# Patient Record
Sex: MALE | Race: WHITE | Employment: OTHER | URBAN - METROPOLITAN AREA
[De-identification: names, ages, dates, MRNs, and addresses within clinical notes are randomized per-mention and may not be internally consistent; named-entity substitution may affect disease eponyms.]

---

## 2017-03-01 PROBLEM — K40.91 RECURRENT LEFT INGUINAL HERNIA: Status: ACTIVE | Noted: 2017-03-01

## 2017-03-07 ENCOUNTER — HOSPITAL ENCOUNTER (OUTPATIENT)
Dept: SURGERY | Age: 62
Discharge: HOME | End: 2017-03-07

## 2017-03-07 VITALS
OXYGEN SATURATION: 94 % | RESPIRATION RATE: 20 BRPM | DIASTOLIC BLOOD PRESSURE: 72 MMHG | HEIGHT: 72 IN | SYSTOLIC BLOOD PRESSURE: 134 MMHG | HEART RATE: 95 BPM | BODY MASS INDEX: 19.28 KG/M2 | TEMPERATURE: 97.5 F | WEIGHT: 142.38 LBS

## 2017-03-07 NOTE — PERIOP NOTES
Patient verified name, , and surgery as listed in [facility]. TYPE  CASE:1b  Orders per surgeon: on chart  Labs per surgeon:none  Labs per anesthesia protocol: none :   EKG  :  none      Patient provided with handouts including guide to surgery , transfusions, pain management and hand hygiene for the family and community. Pt verbalizes understanding of all pre-op instructions . Instructed that family must be present in building at all times. Hibiclens and instructions given per hospital policy. Instructed patient to continue  previous medications as prescribed prior to surgery and  to take the following medications the day of surgery according to anesthesia guidelines : lipitor, synthroid,effexor,vyvanse       Original medication prescription bottles was not visualized during patient appointment. Continue all previous medications unless otherwise directed. Instructed patient to hold  the following medications prior to surgery: none      Patient verbalized understanding of all instructions and provided all medical/health information to the best of their ability.

## 2017-03-13 ENCOUNTER — ANESTHESIA EVENT (OUTPATIENT)
Dept: SURGERY | Age: 62
End: 2017-03-13
Payer: COMMERCIAL

## 2017-03-14 ENCOUNTER — HOSPITAL ENCOUNTER (OUTPATIENT)
Age: 62
Setting detail: OUTPATIENT SURGERY
Discharge: HOME | End: 2017-03-14
Attending: SURGERY | Admitting: SURGERY
Payer: COMMERCIAL

## 2017-03-14 ENCOUNTER — SURGERY (OUTPATIENT)
Age: 62
End: 2017-03-14

## 2017-03-14 ENCOUNTER — ANESTHESIA (OUTPATIENT)
Dept: SURGERY | Age: 62
End: 2017-03-14
Payer: COMMERCIAL

## 2017-03-14 VITALS
SYSTOLIC BLOOD PRESSURE: 115 MMHG | TEMPERATURE: 98.4 F | HEIGHT: 72 IN | OXYGEN SATURATION: 97 % | BODY MASS INDEX: 19.1 KG/M2 | HEART RATE: 56 BPM | DIASTOLIC BLOOD PRESSURE: 56 MMHG | WEIGHT: 141 LBS | RESPIRATION RATE: 16 BRPM

## 2017-03-14 PROBLEM — K40.30 INCARCERATED INGUINAL HERNIA: Status: ACTIVE | Noted: 2017-03-14

## 2017-03-14 PROCEDURE — 74011250637 HC RX REV CODE- 250/637: Performed by: ANESTHESIOLOGY

## 2017-03-14 PROCEDURE — 77030012406 HC DRN WND PENRS BARD -A: Performed by: SURGERY

## 2017-03-14 PROCEDURE — 77030002986 HC SUT PROL J&J -A: Performed by: SURGERY

## 2017-03-14 PROCEDURE — 77030008703 HC TU ET UNCUF COVD -A: Performed by: ANESTHESIOLOGY

## 2017-03-14 PROCEDURE — 77030031139 HC SUT VCRL2 J&J -A: Performed by: SURGERY

## 2017-03-14 PROCEDURE — 74011000250 HC RX REV CODE- 250: Performed by: SURGERY

## 2017-03-14 PROCEDURE — 76210000063 HC OR PH I REC FIRST 0.5 HR: Performed by: SURGERY

## 2017-03-14 PROCEDURE — 77030018836 HC SOL IRR NACL ICUM -A: Performed by: SURGERY

## 2017-03-14 PROCEDURE — 74011250636 HC RX REV CODE- 250/636

## 2017-03-14 PROCEDURE — 77030008467 HC STPLR SKN COVD -B: Performed by: SURGERY

## 2017-03-14 PROCEDURE — 74011250636 HC RX REV CODE- 250/636: Performed by: ANESTHESIOLOGY

## 2017-03-14 PROCEDURE — 77030020782 HC GWN BAIR PAWS FLX 3M -B: Performed by: ANESTHESIOLOGY

## 2017-03-14 PROCEDURE — 74011000250 HC RX REV CODE- 250

## 2017-03-14 PROCEDURE — C1781 MESH (IMPLANTABLE): HCPCS | Performed by: SURGERY

## 2017-03-14 PROCEDURE — 76010000162 HC OR TIME 1.5 TO 2 HR INTENSV-TIER 1: Performed by: SURGERY

## 2017-03-14 PROCEDURE — 77030018673: Performed by: SURGERY

## 2017-03-14 PROCEDURE — 77030003028 HC SUT VCRL J&J -A: Performed by: SURGERY

## 2017-03-14 PROCEDURE — 76210000021 HC REC RM PH II 0.5 TO 1 HR: Performed by: SURGERY

## 2017-03-14 PROCEDURE — 77030011640 HC PAD GRND REM COVD -A: Performed by: SURGERY

## 2017-03-14 PROCEDURE — 76060000034 HC ANESTHESIA 1.5 TO 2 HR: Performed by: SURGERY

## 2017-03-14 PROCEDURE — 77030002996 HC SUT SLK J&J -A: Performed by: SURGERY

## 2017-03-14 PROCEDURE — 77030032490 HC SLV COMPR SCD KNE COVD -B: Performed by: SURGERY

## 2017-03-14 PROCEDURE — 77030008477 HC STYL SATN SLP COVD -A: Performed by: ANESTHESIOLOGY

## 2017-03-14 PROCEDURE — 74011250636 HC RX REV CODE- 250/636: Performed by: SURGERY

## 2017-03-14 DEVICE — MESH HERN W3XL6IN INGUINAL POLYPR MFIL RECTANG: Type: IMPLANTABLE DEVICE | Site: GROIN | Status: FUNCTIONAL

## 2017-03-14 RX ORDER — CEFAZOLIN SODIUM IN 0.9 % NACL 2 G/50 ML
2 INTRAVENOUS SOLUTION, PIGGYBACK (ML) INTRAVENOUS ONCE
Status: COMPLETED | OUTPATIENT
Start: 2017-03-14 | End: 2017-03-14

## 2017-03-14 RX ORDER — NEOSTIGMINE METHYLSULFATE 1 MG/ML
INJECTION INTRAVENOUS AS NEEDED
Status: DISCONTINUED | OUTPATIENT
Start: 2017-03-14 | End: 2017-03-14 | Stop reason: HOSPADM

## 2017-03-14 RX ORDER — BUPIVACAINE HYDROCHLORIDE 2.5 MG/ML
INJECTION, SOLUTION EPIDURAL; INFILTRATION; INTRACAUDAL AS NEEDED
Status: DISCONTINUED | OUTPATIENT
Start: 2017-03-14 | End: 2017-03-14 | Stop reason: HOSPADM

## 2017-03-14 RX ORDER — LIDOCAINE HYDROCHLORIDE 20 MG/ML
INJECTION, SOLUTION EPIDURAL; INFILTRATION; INTRACAUDAL; PERINEURAL AS NEEDED
Status: DISCONTINUED | OUTPATIENT
Start: 2017-03-14 | End: 2017-03-14 | Stop reason: HOSPADM

## 2017-03-14 RX ORDER — SODIUM CHLORIDE 0.9 % (FLUSH) 0.9 %
5-10 SYRINGE (ML) INJECTION AS NEEDED
Status: DISCONTINUED | OUTPATIENT
Start: 2017-03-14 | End: 2017-03-14 | Stop reason: HOSPADM

## 2017-03-14 RX ORDER — SODIUM CHLORIDE 9 MG/ML
50 INJECTION, SOLUTION INTRAVENOUS CONTINUOUS
Status: DISCONTINUED | OUTPATIENT
Start: 2017-03-14 | End: 2017-03-14 | Stop reason: HOSPADM

## 2017-03-14 RX ORDER — HYDROMORPHONE HYDROCHLORIDE 2 MG/ML
0.5 INJECTION, SOLUTION INTRAMUSCULAR; INTRAVENOUS; SUBCUTANEOUS
Status: DISCONTINUED | OUTPATIENT
Start: 2017-03-14 | End: 2017-03-14 | Stop reason: HOSPADM

## 2017-03-14 RX ORDER — SODIUM CHLORIDE 0.9 % (FLUSH) 0.9 %
5-10 SYRINGE (ML) INJECTION EVERY 8 HOURS
Status: DISCONTINUED | OUTPATIENT
Start: 2017-03-14 | End: 2017-03-14 | Stop reason: HOSPADM

## 2017-03-14 RX ORDER — ONDANSETRON 2 MG/ML
INJECTION INTRAMUSCULAR; INTRAVENOUS AS NEEDED
Status: DISCONTINUED | OUTPATIENT
Start: 2017-03-14 | End: 2017-03-14 | Stop reason: HOSPADM

## 2017-03-14 RX ORDER — HYDROCODONE BITARTRATE AND ACETAMINOPHEN 5; 325 MG/1; MG/1
1 TABLET ORAL AS NEEDED
Status: DISCONTINUED | OUTPATIENT
Start: 2017-03-14 | End: 2017-03-14 | Stop reason: HOSPADM

## 2017-03-14 RX ORDER — FAMOTIDINE 10 MG/ML
20 INJECTION INTRAVENOUS ONCE
Status: DISCONTINUED | OUTPATIENT
Start: 2017-03-14 | End: 2017-03-14 | Stop reason: HOSPADM

## 2017-03-14 RX ORDER — ROCURONIUM BROMIDE 10 MG/ML
INJECTION, SOLUTION INTRAVENOUS AS NEEDED
Status: DISCONTINUED | OUTPATIENT
Start: 2017-03-14 | End: 2017-03-14 | Stop reason: HOSPADM

## 2017-03-14 RX ORDER — NEOSTIGMINE METHYLSULFATE 1 MG/ML
INJECTION INTRAVENOUS AS NEEDED
Status: DISCONTINUED | OUTPATIENT
Start: 2017-03-14 | End: 2017-03-14

## 2017-03-14 RX ORDER — BACITRACIN 50000 [IU]/1
INJECTION, POWDER, FOR SOLUTION INTRAMUSCULAR AS NEEDED
Status: DISCONTINUED | OUTPATIENT
Start: 2017-03-14 | End: 2017-03-14 | Stop reason: HOSPADM

## 2017-03-14 RX ORDER — LIDOCAINE HYDROCHLORIDE 10 MG/ML
0.1 INJECTION INFILTRATION; PERINEURAL AS NEEDED
Status: DISCONTINUED | OUTPATIENT
Start: 2017-03-14 | End: 2017-03-14 | Stop reason: HOSPADM

## 2017-03-14 RX ORDER — FENTANYL CITRATE 50 UG/ML
INJECTION, SOLUTION INTRAMUSCULAR; INTRAVENOUS AS NEEDED
Status: DISCONTINUED | OUTPATIENT
Start: 2017-03-14 | End: 2017-03-14 | Stop reason: HOSPADM

## 2017-03-14 RX ORDER — PROPOFOL 10 MG/ML
INJECTION, EMULSION INTRAVENOUS AS NEEDED
Status: DISCONTINUED | OUTPATIENT
Start: 2017-03-14 | End: 2017-03-14 | Stop reason: HOSPADM

## 2017-03-14 RX ORDER — SODIUM CHLORIDE, SODIUM LACTATE, POTASSIUM CHLORIDE, CALCIUM CHLORIDE 600; 310; 30; 20 MG/100ML; MG/100ML; MG/100ML; MG/100ML
150 INJECTION, SOLUTION INTRAVENOUS CONTINUOUS
Status: DISCONTINUED | OUTPATIENT
Start: 2017-03-14 | End: 2017-03-14 | Stop reason: HOSPADM

## 2017-03-14 RX ORDER — FENTANYL CITRATE 50 UG/ML
100 INJECTION, SOLUTION INTRAMUSCULAR; INTRAVENOUS ONCE
Status: DISCONTINUED | OUTPATIENT
Start: 2017-03-14 | End: 2017-03-14 | Stop reason: HOSPADM

## 2017-03-14 RX ORDER — MIDAZOLAM HYDROCHLORIDE 1 MG/ML
2 INJECTION, SOLUTION INTRAMUSCULAR; INTRAVENOUS
Status: DISCONTINUED | OUTPATIENT
Start: 2017-03-14 | End: 2017-03-14 | Stop reason: HOSPADM

## 2017-03-14 RX ORDER — GLYCOPYRROLATE 0.2 MG/ML
INJECTION INTRAMUSCULAR; INTRAVENOUS AS NEEDED
Status: DISCONTINUED | OUTPATIENT
Start: 2017-03-14 | End: 2017-03-14 | Stop reason: HOSPADM

## 2017-03-14 RX ORDER — ACETAMINOPHEN 500 MG
1000 TABLET ORAL
Status: DISCONTINUED | OUTPATIENT
Start: 2017-03-14 | End: 2017-03-14 | Stop reason: HOSPADM

## 2017-03-14 RX ORDER — FAMOTIDINE 20 MG/1
20 TABLET, FILM COATED ORAL ONCE
Status: COMPLETED | OUTPATIENT
Start: 2017-03-14 | End: 2017-03-14

## 2017-03-14 RX ADMIN — FENTANYL CITRATE 50 MCG: 50 INJECTION, SOLUTION INTRAMUSCULAR; INTRAVENOUS at 14:09

## 2017-03-14 RX ADMIN — ROCURONIUM BROMIDE 30 MG: 10 INJECTION, SOLUTION INTRAVENOUS at 13:26

## 2017-03-14 RX ADMIN — NEOSTIGMINE METHYLSULFATE 3 MG: 1 INJECTION INTRAVENOUS at 14:34

## 2017-03-14 RX ADMIN — GLYCOPYRROLATE 0.2 MG: 0.2 INJECTION INTRAMUSCULAR; INTRAVENOUS at 13:47

## 2017-03-14 RX ADMIN — BACITRACIN 50000 UNITS: 5000 INJECTION, POWDER, FOR SOLUTION INTRAMUSCULAR at 13:56

## 2017-03-14 RX ADMIN — SODIUM CHLORIDE, SODIUM LACTATE, POTASSIUM CHLORIDE, AND CALCIUM CHLORIDE 150 ML/HR: 600; 310; 30; 20 INJECTION, SOLUTION INTRAVENOUS at 10:57

## 2017-03-14 RX ADMIN — LIDOCAINE HYDROCHLORIDE 100 MG: 20 INJECTION, SOLUTION EPIDURAL; INFILTRATION; INTRACAUDAL; PERINEURAL at 13:26

## 2017-03-14 RX ADMIN — FENTANYL CITRATE 100 MCG: 50 INJECTION, SOLUTION INTRAMUSCULAR; INTRAVENOUS at 13:26

## 2017-03-14 RX ADMIN — ROCURONIUM BROMIDE 10 MG: 10 INJECTION, SOLUTION INTRAVENOUS at 14:05

## 2017-03-14 RX ADMIN — ACETAMINOPHEN 1000 MG: 500 TABLET ORAL at 15:49

## 2017-03-14 RX ADMIN — CEFAZOLIN 2 G: 1 INJECTION, POWDER, FOR SOLUTION INTRAMUSCULAR; INTRAVENOUS; PARENTERAL at 13:24

## 2017-03-14 RX ADMIN — ONDANSETRON 4 MG: 2 INJECTION INTRAMUSCULAR; INTRAVENOUS at 14:26

## 2017-03-14 RX ADMIN — PROPOFOL 200 MG: 10 INJECTION, EMULSION INTRAVENOUS at 13:26

## 2017-03-14 RX ADMIN — GLYCOPYRROLATE 0.4 MG: 0.2 INJECTION INTRAMUSCULAR; INTRAVENOUS at 14:34

## 2017-03-14 RX ADMIN — FAMOTIDINE 20 MG: 20 TABLET, FILM COATED ORAL at 10:57

## 2017-03-14 RX ADMIN — SODIUM CHLORIDE, SODIUM LACTATE, POTASSIUM CHLORIDE, AND CALCIUM CHLORIDE: 600; 310; 30; 20 INJECTION, SOLUTION INTRAVENOUS at 13:54

## 2017-03-14 RX ADMIN — BUPIVACAINE HYDROCHLORIDE 30 ML: 2.5 INJECTION, SOLUTION EPIDURAL; INFILTRATION; INTRACAUDAL; PERINEURAL at 14:37

## 2017-03-14 NOTE — H&P (VIEW-ONLY)
[facility]  [address] [place] [address], [address] [place] [address]  [phone]    Office Note/H&P/Consult Note   Ludwin Fowler   MRN: [id number]     : 1955        HPI: Ludwin Fowler is a 63 y.o. male who was referred for evaluation of recurrent left inguinal hernia. He reports noticing mild to moderate pain in the left groin associated with a bulge. This been present for a few weeks. He has no associated nausea or vomiting. He reports he is status post bilateral inguinal hernia repairs in the past.  He has no testicular symptoms. Nothing seems to make the pain or bulging any better or worse. Past Medical History:   Diagnosis Date    Hypertension     Psychiatric disorder      Past Surgical History:   Procedure Laterality Date    ABDOMEN SURGERY PROC UNLISTED      hernia left side about ; right done several years later    HX HEENT      bilateral cataract implants    HX HEENT      wisdom teeth extraction     Current Outpatient Prescriptions   Medication Sig    losartan (COZAAR) 50 mg tablet TAKE 1 TABLET BY MOUTH EVERY DAY    traZODone (DESYREL) 50 mg tablet TAKE 1 TO 2 TABLETS BY MOUTH EVERY NIGHT    lithium carbonate SR (LITHOBID) 300 mg CR tablet Take 900 mg by mouth daily.  atorvastatin (LIPITOR) 10 mg tablet Take 1 Tab by mouth daily.  VYVANSE 50 mg capsule Take 50 mg by mouth daily.  levothyroxine (LEVOXYL) 75 mcg tablet Take 1 Tab by mouth daily.  VENLAFAXINE HCL (EFFEXOR XR PO) Take 300 mg by mouth daily.  DIVALPROEX SODIUM (DEPAKOTE PO) Take 2,000 mg by mouth nightly. No current facility-administered medications for this visit. ALLERGIES:  Review of patient's allergies indicates no known allergies.     Social History     Social History    Marital status:      Spouse name: N/A    Number of children: N/A    Years of education: N/A     Social History Main Topics    Smoking status: Never Smoker    Smokeless tobacco: Never Used    Alcohol use No    Drug use: None    Sexual activity: Not Asked     Other Topics Concern    None     Social History Narrative     History   Smoking Status    Never Smoker   Smokeless Tobacco    Never Used     Family History   Problem Relation Age of Onset    Diabetes Father     Heart Disease Father     Hypertension Father        ROS: The patient has no difficulty with chest pain or shortness of breath. No fever or chills. Comprehensive review of systems was otherwise unremarkable except as noted above. Physical Exam:   Constitutional: Alert oriented cooperative patient in no acute distress. Visit Vitals    /82    Pulse (!) 103    Ht 6' (1.829 m)    Wt 144 lb (65.3 kg)    SpO2 100%    BMI 19.53 kg/m2     Eyes:Sclera are clear. ENMT: no obvious neck masses, no ear or lip lesions  CV: RRR. Normal perfusion  Resp: No JVD. Breathing is  non-labored. GI: soft and non-distended   . +LIH; healed bilateral groin incisions   No testicular masses  Musculoskeletal: unremarkable with normal function. Neuro:  No obvious focal deficits  Psychiatric: normal affect and mood, no memory impairment      Labs:  Lab Results   Component Value Date/Time    Sodium 139 02/11/2016 11:55 AM    Potassium 5.0 02/11/2016 11:55 AM    Chloride 104 02/11/2016 11:55 AM    CO2 21 02/11/2016 11:55 AM    BUN 15 02/11/2016 11:55 AM    Creatinine 0.74 02/11/2016 11:55 AM    Glucose 98 02/11/2016 11:55 AM    Bilirubin, total 0.5 02/11/2016 11:55 AM    AST (SGOT) 37 02/11/2016 11:55 AM    ALT (SGPT) 33 02/11/2016 11:55 AM    Alk. phosphatase 59 02/11/2016 11:55 AM       No results found for this or any previous visit.       Assessment/Plan:     )Jasmin Murphy is a 63 y.o. male who has signs and symptoms consistent with the below issues:  Problem List  Date Reviewed: 3/1/2017          Codes Class Noted    Recurrent left inguinal hernia ICD-10-CM: K40.91  ICD-9-CM: 550.91  3/1/2017        Essential hypertension ICD-10-CM: I10  ICD-9-CM: 401.9  1/7/2016        Acquired hypothyroidism ICD-10-CM: E03.9  ICD-9-CM: 244.9  1/7/2016        Hypercholesterolemia ICD-10-CM: E78.00  ICD-9-CM: 272.0  1/7/2016        Bipolar 2 disorder ([facility] ICD-10-CM: F31.81  ICD-9-CM: 296.89  1/7/2016        Impaired fasting glucose ICD-10-CM: R73.01  ICD-9-CM: 790.21  1/7/2016                He has recurrent left inguinal hernia. Informed consent was obtained for open recurrent left inguinal hernia repair with mesh. We will schedule this for him soon.         Manohar Will MD,  FACS          CC: Tiffanie Teresa MD

## 2017-03-14 NOTE — ANESTHESIA POSTPROCEDURE EVALUATION
Post-Anesthesia Evaluation and Assessment    Patient: Steve Amaya MRN: [id number]  SSN: [id number]    YOB: 1955  Age: 63 y.o. Sex: male       Cardiovascular Function/Vital Signs  Visit Vitals    /56    Pulse (!) 56    Temp 36.7 °C (98.1 °F)    Resp 16    Ht 6' (1.829 m)    Wt 64 kg (141 lb)    SpO2 97%    BMI 19.12 kg/m2       Patient is status post general anesthesia for Procedure(s):  LEFT HERNIA INGUINAL REPAIR WITH MESH. Nausea/Vomiting: None    Postoperative hydration reviewed and adequate. Pain:  Pain Scale 1: Numeric (0 - 10) (03/14/17 1549)  Pain Intensity 1: 4 (03/14/17 1549)   Managed    Neurological Status:   Neuro (WDL): Within Defined Limits (03/14/17 1457)   At baseline    Mental Status and Level of Consciousness: Arousable    Pulmonary Status:   O2 Device: Nasal cannula (03/14/17 1457)   Adequate oxygenation and airway patent    Complications related to anesthesia: None    Post-anesthesia assessment completed.  No concerns    Signed By: Vincent Mistry MD     March 14, 2017

## 2017-03-14 NOTE — ANESTHESIA PREPROCEDURE EVALUATION
Anesthetic History   No history of anesthetic complications       Comments: Occasionally wakes up confused.      Review of Systems / Medical History  Patient summary reviewed, nursing notes reviewed and pertinent labs reviewed    Pulmonary        Sleep apnea: No treatment           Neuro/Psych         Psychiatric history (bipolar on several meds.)     Cardiovascular    Hypertension: well controlled          Hyperlipidemia    Exercise tolerance: >4 METS     GI/Hepatic/Renal  Within defined limits              Endo/Other      Hypothyroidism: well controlled       Other Findings              Physical Exam    Airway  Mallampati: II  TM Distance: 4 - 6 cm  Neck ROM: normal range of motion   Mouth opening: Normal     Cardiovascular  Regular rate and rhythm,  S1 and S2 normal,  no murmur, click, rub, or gallop  Rhythm: regular  Rate: normal         Dental  No notable dental hx       Pulmonary  Breath sounds clear to auscultation               Abdominal         Other Findings            Anesthetic Plan    ASA: 2  Anesthesia type: general          Induction: Intravenous  Anesthetic plan and risks discussed with: Patient and Spouse

## 2017-03-14 NOTE — INTERVAL H&P NOTE
H&P Update:  Cathy Goodwin was seen and examined. History and physical has been reviewed. The patient has been examined.  There have been no significant clinical changes since the completion of the originally dated History and Physical.    Signed By: Stephani Hood MD     March 14, 2017 2:55 PM

## 2017-03-14 NOTE — OP NOTES
[address] [facility]  [phone]    [facility] REPORT    Name: Winnie Zhong     Date of Surgery: 3/14/2017  Med Record Number: [id number]   Age: 63 y.o. Sex: male   Pre-operative Diagnosis:   Recurrent left inguinal hernia [K40.91]  (prior surgery at [facility])  Post-operative Diagnosis: same  Procedure: Recurrent, open left Indirect Inguinal Hernia Repair with mesh (with an incarcerated direct hernia component as well)  Surgeon: Jarett Tillman MD  Assistants: none  Anesthesia:  General  Complications: none  Specimens:  none  Estimated Blood Loss:  <30cc        Procedure Description:   The risks, benefits, potential complications, treatment options, and expected outcomes were discussed with the patient pre-operatively. The possibilities of reaction to medication, chronic pain, bleeding, infection, injury to internal organs, recurrence, testicular damage, blood clots, the need for further surgery, heart attack, stroke, and death were discussed with the patient. The patient voiced understanding and gave informed consent preoperatively. The surgical site was marked preoperatively. The patient was taken to the Operating Room, and the [facility] time-out protocol checklist was followed. After the induction of adequate anesthesia, the abdomen was prepped and draped in the usual sterile fashion. An Pablo Fell was used to prevent any contact with the skin. Preoperative antibiotics were given. An oblique inguinal incision was made and carried to the external oblique fascia and external ring. The external oblique was opened in the direction of its fibers down to the ring and the spermatic cord was encircled with a penrose drain. Dissection was tedious from scar and fibrosis following prior inguinal hernia repair.   There were 2 prolene knots seen at the internal inguinal ring and it looked like the patient may have had a mesh plug surgery  and had herniated around the plug.  There was no mesh reconstruction of the groin floor. There was a small incarcerated direct hernia component near the pubic tubercle which was freed up and reduced. The larger hernia sac was identified in the spermatic cord. It was dissected free from the spermatic cord taking great care not to injury the ilioinguinal nerve or cord structures. The hernia sac was opened and ligated high from within with a 2-O silk purse string, and then a second silk suture was used to doubly ligated a little more distally. The distal sac was excised and removed from the field and proximal sac was allowed to retract into the preperitioneal space. The wound was irrigated with Bacitracin irrigation. A piece of prolene mesh was soaked in bacitracin, cut to size and anchored at the pubic tubercle with 2-0 prolene suture. This covered and treated the direct hernia component. The mesh was secured medially to the conjoint tendon and laterally to the iliopubic tract and then the defect cut in the mesh to allow accommodation the cord was secured with 2-0 prolene suture superolaterally. The wound was once again irrigated with bacitracin and hemostasis confirmed. It was then anesthetized with marcaine and the external oblique was re approximated with 2-0 vicryl suture. Sommer's fascia was closed with 3-O vicryl suture. The skin was closed with clips, and a sterile dressing of Telfa and tegaderm was placed. At the end of the operation, all sponge, instruments, and needle counts were correct.      Donald Kim MD, FACS

## 2017-03-14 NOTE — ROUTINE PROCESS
Discharge instructions reviewed with pt and family member who verbalize understanding of follow up care. One prescription stapled to discharge paperwork, given to patient and family.

## 2017-03-14 NOTE — IP AVS SNAPSHOT
Cathleen Bailey 
 
 
 [address] [facility] [address] 
[phone] Patient: Olegario Gotti MRN: [id number] BZQ:[id number] You are allergic to the following No active allergies Recent Documentation Height Weight BMI Smoking Status 1.829 m 64 kg 19.12 kg/m2 Never Smoker Emergency Contacts Name Discharge Info Relation Home Work Mobile Ml Lopez DISCHARGE CAREGIVER [3] Spouse [3] [phone] About your hospitalization You were admitted on:  March 14, 2017 [facility] You were discharged on:  March 14, 2017 Unit phone number:  [phone] Why you were hospitalized Your primary diagnosis was:  Recurrent Left Inguinal Hernia Your diagnoses also included:  Incarcerated Inguinal Hernia, Essential Hypertension, Acquired Hypothyroidism Providers Seen During Your Hospitalizations Provider Role Specialty Primary office phone Tiff Valentin MD Attending Provider General Surgery [phone] Your Primary Care Physician (PCP) Primary Care Physician Office Phone Office Fax Sandy Milian [phone] Follow-up Information Follow up With Details Comments Contact Info Elsa Landin MD   [address] [place] [address] [address] [facility] [address] 
[phone] Tiff Valentin MD Go on 3/22/2017 For re-check at 11:45AM [address] [facility] [address] Misbah Cavanaugh [address] [address] [phone] Your Appointments Wednesday March 22, 2017 11:45 AM EDT Global Post Op with Tiff Valentin MD  
[facility] ([facility]) [address] [facility] [address] [place] [address]  
[phone] Wednesday April 19, 2017  9:00 AM EDT  
LAB with [facility] ([facility]) [address] [facility] [address] [phone] Monday April 24, 2017  8:30 AM EDT PHYSICAL with Caitlyn Cleaning MD  
[facility] ([facility]) [address] Elian Santiago [place] [address] [phone] Current Discharge Medication List  
  
CONTINUE these medications which have CHANGED Dose & Instructions Dispensing Information Comments Morning Noon Evening Bedtime  
 atorvastatin 10 mg tablet Commonly known as:  LIPITOR What changed:  when to take this Your last dose was: Your next dose is:    
   
   
 Dose:  10 mg Take 1 Tab by mouth daily. Quantity:  30 Tab Refills:  12 CONTINUE these medications which have NOT CHANGED Dose & Instructions Dispensing Information Comments Morning Noon Evening Bedtime DEPAKOTE PO Your last dose was: Your next dose is:    
   
   
 Dose:  2000 mg Take 2,000 mg by mouth nightly. Refills:  0  
     
   
   
   
  
 EFFEXOR XR PO Your last dose was: Your next dose is:    
   
   
 Dose:  150 mg Take 150 mg by mouth every morning. Refills:  0  
     
   
   
   
  
 * levothyroxine 75 mcg tablet Commonly known as:  LEVOXYL Your last dose was: Your next dose is:    
   
   
 Dose:  75 mcg Take 1 Tab by mouth six (6) days a week. And 1/2 on 7th day Quantity:  90 Tab Refills:  4  
     
   
   
   
  
 * levothyroxine 75 mcg tablet Commonly known as:  SYNTHROID Your last dose was: Your next dose is: TAKE 1 TABLET BY MOUTH EVERY DAY Quantity:  90 Tab Refills:  3  
     
   
   
   
  
 lithium carbonate  mg CR tablet Commonly known as:  Catherene Bump Your last dose was: Your next dose is:    
   
   
 Dose:  900 mg Take 900 mg by mouth nightly. Refills:  0  
     
   
   
   
  
 losartan 50 mg tablet Commonly known as:  COZAAR  
   
 Your last dose was: Your next dose is: TAKE 1 TABLET BY MOUTH EVERY DAY Quantity:  90 Tab Refills:  3  
     
   
   
   
  
 traZODone 50 mg tablet Commonly known as:  Angel Paddy Your last dose was: Your next dose is: TAKE 1 TO 2 TABLETS BY MOUTH EVERY NIGHT Refills:  11  
     
   
   
   
  
 VYVANSE 50 mg Cap Generic drug:  Lisdexamfetamine Your last dose was: Your next dose is:    
   
   
 Dose:  50 mg Take 50 mg by mouth every morning. Refills:  0  
     
   
   
   
  
 * Notice: This list has 2 medication(s) that are the same as other medications prescribed for you. Read the directions carefully, and ask your doctor or other care provider to review them with you. Discharge Instructions Leave dressings 5-6 days. Then remove, but keep incision covered daily until follow-up. Try to keep incision as dry as possible to lower risk of infection. No heavy lifting (>5lbs) for 6 weeks to reduce risk of developing a hernia in the incisions. No driving until you are off pain meds for 24hrs and have no pain with movements associated with driving. Pain prescription (Percocet) on chart for patient to use as needed for pain Follow-up with Dr Susi Bonilla in 8 days on a Wednesday in the office at: 
[address], [address] 
(Call for an appt time-->[phone]) HERNIA REPAIR 
 
ACTIVITY · As tolerated and as directed by your doctor. · You may shower starting tomorrow but do not take a bath until released by your doctor. · Avoid lifting more than 5 pounds (pulling and straining). Avoid excessive use of stairs. · Take deep breathes and support incision with pillow when you cough. DIET · Clear liquids until no nausea or vomiting; then light diet for the first day. · Advance to regular diet on second day, unless directed by your doctor. · If nausea or vomiting continues, call your doctor. You may notice that your bowel movements are not regular right after your surgery. This is common. Try to avoid constipation and straining with bowel movements. You may want to take a fiber supplement every day (Miralax). If you have not had a bowel movement after a couple of days, ask your doctor about taking a mild laxative. CALL YOUR DOCTOR IF  
· Excessive bleeding that does not stop after holding pressure over the area. · Temperature of 101 degrees F or above. · Redness, excessive swelling or bruising, and/ or green or yellow, smelly discharge from incision. AFTER ANESTHESIA · For the first 24 hours: DO NOT drive, drink alcoholic beverages, or make important decisions. · Be aware of dizziness following anesthesia and while taking pain medication. · Limit your activities · Do not  operate hazardous machinery · If you have not urinated within 8 hours after discharge, please contact your surgeon on call. *  Please give a list of your current medications to your Primary Care Provider. *  Please update this list whenever your medications are discontinued, doses are 
    changed, or new medications (including over-the-counter products) are added. *  Please carry medication information at all times in case of emergency situations. No smoking/ No tobacco products/ Avoid exposure to second hand smoke Surgeon General's Warning:  Quitting smoking now greatly reduces serious risk to your health. Obesity, smoking, and sedentary lifestyle greatly increases your risk for illness A healthy diet, regular physical exercise & weight monitoring are important for maintaining a healthy lifestyle Recognize signs and symptoms of STROKE: 
 
F-face looks uneven A-arms unable to move or move unevenly S-speech slurred or non-existent T-time-call 911 as soon as signs and symptoms begin-DO NOT go Back to bed or wait to see if you get better-TIME IS BRAIN. Discharge Orders None  
  
Introducing [facility]! [employer] introduces [employer] patient portal. Now you can access parts of your medical record, email your doctor's office, and request medication refills online. 1. In your internet browser, go to [email or web address] 2. Click on the First Time User? Click Here link in the Sign In box. You will see the New Member Sign Up page. 3. Enter your [employer] Access Code exactly as it appears below. You will not need to use this code after youve completed the sign-up process. If you do not sign up before the expiration date, you must request a new code. · [employer] Access Code: [email or web address] Expires: 5/14/2017 10:41 AM 
 
4. Enter the last four digits of your Social Security Number (xxxx) and Date of Birth (mm/dd/yyyy) as indicated and click Submit. You will be taken to the next sign-up page. 5. Create a [employer] ID. This will be your [employer] login ID and cannot be changed, so think of one that is secure and easy to remember. 6. Create a [employer] password. You can change your password at any time. 7. Enter your Password Reset Question and Answer. This can be used at a later time if you forget your password. 8. Enter your e-mail address. You will receive e-mail notification when new information is available in [address] 9. Click Sign Up. You can now view and download portions of your medical record. 10. Click the Download Summary menu link to download a portable copy of your medical information. If you have questions, please visit the Frequently Asked Questions section of the [employer] website. Remember, [employer] is NOT to be used for urgent needs. For medical emergencies, dial 911. Now available from your iPhone and Android! General Information Please provide this summary of care documentation to your next provider. Patient Signature:  ____________________________________________________________ Date:  ____________________________________________________________  
  
Elmyra Sharp Provider Signature:  ____________________________________________________________ Date:  ____________________________________________________________

## 2017-03-14 NOTE — DISCHARGE INSTRUCTIONS
Leave dressings 5-6 days. Then remove, but keep incision covered daily until follow-up. Try to keep incision as dry as possible to lower risk of infection. No heavy lifting (>5lbs) for 6 weeks to reduce risk of developing a hernia in the incisions. No driving until you are off pain meds for 24hrs and have no pain with movements associated with driving. Pain prescription (Percocet) on chart for patient to use as needed for pain  Follow-up with Dr Umang Brown in 8 days on a Wednesday in the office at:  [address], [address]  (Call for an appt time-->[phone])    HERNIA REPAIR    ACTIVITY  · As tolerated and as directed by your doctor. · You may shower starting tomorrow but do not take a bath until released by your doctor. · Avoid lifting more than 5 pounds (pulling and straining). Avoid excessive use of stairs. · Take deep breathes and support incision with pillow when you cough. DIET  · Clear liquids until no nausea or vomiting; then light diet for the first day. · Advance to regular diet on second day, unless directed by your doctor. · If nausea or vomiting continues, call your doctor. You may notice that your bowel movements are not regular right after your surgery. This is common. Try to avoid constipation and straining with bowel movements. You may want to take a fiber supplement every day (Miralax). If you have not had a bowel movement after a couple of days, ask your doctor about taking a mild laxative. CALL YOUR DOCTOR IF   · Excessive bleeding that does not stop after holding pressure over the area. · Temperature of 101 degrees F or above. · Redness, excessive swelling or bruising, and/ or green or yellow, smelly discharge from incision. AFTER ANESTHESIA  · For the first 24 hours: DO NOT drive, drink alcoholic beverages, or make important decisions. · Be aware of dizziness following anesthesia and while taking pain medication.   · Limit your activities  · Do not  operate hazardous machinery  · If you have not urinated within 8 hours after discharge, please contact your surgeon on call. *  Please give a list of your current medications to your Primary Care Provider. *  Please update this list whenever your medications are discontinued, doses are      changed, or new medications (including over-the-counter products) are added. *  Please carry medication information at all times in case of emergency situations. No smoking/ No tobacco products/ Avoid exposure to second hand smoke    Surgeon General's Warning:  Quitting smoking now greatly reduces serious risk to your health. Obesity, smoking, and sedentary lifestyle greatly increases your risk for illness    A healthy diet, regular physical exercise & weight monitoring are important for maintaining a healthy lifestyle    Recognize signs and symptoms of STROKE:    F-face looks uneven    A-arms unable to move or move unevenly    S-speech slurred or non-existent    T-time-call 911 as soon as signs and symptoms begin-DO NOT go       Back to bed or wait to see if you get better-TIME IS BRAIN.

## 2017-03-21 RX ORDER — CEFAZOLIN SODIUM IN 0.9 % NACL 2 G/50 ML
2 INTRAVENOUS SOLUTION, PIGGYBACK (ML) INTRAVENOUS ONCE
Status: CANCELLED | OUTPATIENT
Start: 2017-03-21 | End: 2017-03-21

## 2017-03-22 PROBLEM — K40.30 INCARCERATED INGUINAL HERNIA: Status: RESOLVED | Noted: 2017-03-14 | Resolved: 2017-03-22

## 2018-08-08 PROBLEM — M20.41 HAMMER TOE OF RIGHT FOOT: Status: ACTIVE | Noted: 2018-08-08

## 2020-01-27 PROBLEM — Z51.81 ENCOUNTER FOR MEDICATION MONITORING: Status: ACTIVE | Noted: 2020-01-27

## 2020-01-27 PROBLEM — R44.3 HALLUCINATIONS: Status: ACTIVE | Noted: 2020-01-27

## 2020-08-21 ENCOUNTER — HOSPITAL ENCOUNTER (OUTPATIENT)
Dept: LAB | Age: 65
Discharge: HOME | End: 2020-08-21
Payer: COMMERCIAL

## 2020-08-21 DIAGNOSIS — Z13.21 ENCOUNTER FOR VITAMIN DEFICIENCY SCREENING: ICD-10-CM

## 2020-08-21 DIAGNOSIS — Z00.00 ANNUAL PHYSICAL EXAM: ICD-10-CM

## 2020-08-21 DIAGNOSIS — E03.9 ACQUIRED HYPOTHYROIDISM: ICD-10-CM

## 2020-08-21 LAB
25(OH)D3 SERPL-MCNC: 37.7 NG/ML (ref 30–100)
CORTIS BS SERPL-MCNC: 6.2 UG/DL
PROLACTIN SERPL-MCNC: 5.6 NG/ML
T4 FREE SERPL-MCNC: 0.6 NG/DL (ref 0.9–1.8)

## 2020-08-21 PROCEDURE — 36415 COLL VENOUS BLD VENIPUNCTURE: CPT

## 2020-08-21 PROCEDURE — 82306 VITAMIN D 25 HYDROXY: CPT

## 2020-08-21 PROCEDURE — 84439 ASSAY OF FREE THYROXINE: CPT

## 2020-08-21 PROCEDURE — 82533 TOTAL CORTISOL: CPT

## 2020-08-21 PROCEDURE — 84146 ASSAY OF PROLACTIN: CPT

## 2020-08-26 NOTE — PROGRESS NOTES
Discussed lab with wife  2 days ago-remind her to hold the thyroid med for 3 more weeks --then set up lab here to do fasting at 8 am and will check other pituitary hormones also with the thyroid and set up endo referral(I'll put those in).

## 2020-12-09 PROBLEM — E03.8 CENTRAL HYPOTHYROIDISM: Status: ACTIVE | Noted: 2020-12-09

## 2020-12-22 ENCOUNTER — HOSPITAL ENCOUNTER (OUTPATIENT)
Dept: MRI IMAGING | Age: 65
Discharge: HOME | End: 2020-12-22
Attending: INTERNAL MEDICINE
Payer: COMMERCIAL

## 2020-12-22 DIAGNOSIS — D35.2 PITUITARY ADENOMA (HCC): ICD-10-CM

## 2020-12-22 PROCEDURE — 70553 MRI BRAIN STEM W/O & W/DYE: CPT

## 2020-12-22 PROCEDURE — 74011250636 HC RX REV CODE- 250/636: Performed by: INTERNAL MEDICINE

## 2020-12-22 PROCEDURE — 74011000258 HC RX REV CODE- 258: Performed by: INTERNAL MEDICINE

## 2020-12-22 PROCEDURE — A9575 INJ GADOTERATE MEGLUMI 0.1ML: HCPCS | Performed by: INTERNAL MEDICINE

## 2020-12-22 RX ORDER — SODIUM CHLORIDE 0.9 % (FLUSH) 0.9 %
10 SYRINGE (ML) INJECTION
Status: COMPLETED | OUTPATIENT
Start: 2020-12-22 | End: 2020-12-22

## 2020-12-22 RX ORDER — GADOTERATE MEGLUMINE 376.9 MG/ML
13 INJECTION INTRAVENOUS
Status: COMPLETED | OUTPATIENT
Start: 2020-12-22 | End: 2020-12-22

## 2020-12-22 RX ADMIN — Medication 10 ML: at 07:55

## 2020-12-22 RX ADMIN — GADOTERATE MEGLUMINE 13 ML: 376.9 INJECTION INTRAVENOUS at 07:55

## 2020-12-22 RX ADMIN — SODIUM CHLORIDE 100 ML: 900 INJECTION, SOLUTION INTRAVENOUS at 07:55

## 2021-01-11 PROBLEM — T50.905A THROMBOCYTOPENIA DUE TO DRUGS: Status: ACTIVE | Noted: 2021-01-11

## 2021-01-11 PROBLEM — D69.59 THROMBOCYTOPENIA DUE TO DRUGS: Status: ACTIVE | Noted: 2021-01-11

## 2021-03-17 ENCOUNTER — TRANSCRIBE ORDER (OUTPATIENT)
Dept: SCHEDULING | Age: 66
End: 2021-03-17

## 2021-03-17 DIAGNOSIS — R79.89 LOW IGF-1 LEVEL: Primary | ICD-10-CM

## 2021-04-13 ENCOUNTER — HOSPITAL ENCOUNTER (OUTPATIENT)
Dept: MAMMOGRAPHY | Age: 66
Discharge: HOME | End: 2021-04-13
Attending: INTERNAL MEDICINE
Payer: COMMERCIAL

## 2021-04-13 DIAGNOSIS — D35.2 PITUITARY ADENOMA (HCC): ICD-10-CM

## 2021-04-13 DIAGNOSIS — R79.89 LOW SERUM CORTISOL LEVEL: ICD-10-CM

## 2021-04-13 DIAGNOSIS — E03.9 ACQUIRED HYPOTHYROIDISM: ICD-10-CM

## 2021-04-13 DIAGNOSIS — E23.0 PITUITARY INSUFFICIENCY (HCC): ICD-10-CM

## 2021-04-13 DIAGNOSIS — E23.0 GROWTH HORMONE DEFICIENCY (HCC): ICD-10-CM

## 2021-04-13 PROCEDURE — 77080 DXA BONE DENSITY AXIAL: CPT

## 2021-04-14 NOTE — PROGRESS NOTES
Mr. Basim Uvaldo, your bone density is slightly low but average for age. This requires no treatment. Have a great day.

## 2021-11-16 PROBLEM — E23.0 GROWTH HORMONE DEFICIENCY (HCC): Status: ACTIVE | Noted: 2021-11-16

## 2022-01-21 ENCOUNTER — HOSPITAL ENCOUNTER (OUTPATIENT)
Dept: LAB | Age: 67
Discharge: HOME | End: 2022-01-21
Payer: COMMERCIAL

## 2022-01-21 DIAGNOSIS — F31.81 BIPOLAR 2 DISORDER (HCC): ICD-10-CM

## 2022-01-21 DIAGNOSIS — I10 ESSENTIAL HYPERTENSION: ICD-10-CM

## 2022-01-21 LAB
ALBUMIN SERPL-MCNC: 3.3 G/DL (ref 3.2–4.6)
ALBUMIN/GLOB SERPL: 0.8 {RATIO} (ref 1.2–3.5)
ALP SERPL-CCNC: 62 U/L (ref 50–136)
ALT SERPL-CCNC: 23 U/L (ref 12–65)
ANION GAP SERPL CALC-SCNC: 3 MMOL/L (ref 7–16)
AST SERPL-CCNC: 14 U/L (ref 15–37)
BILIRUB SERPL-MCNC: 0.3 MG/DL (ref 0.2–1.1)
BUN SERPL-MCNC: 26 MG/DL (ref 8–23)
CALCIUM SERPL-MCNC: 9.1 MG/DL (ref 8.3–10.4)
CHLORIDE SERPL-SCNC: 109 MMOL/L (ref 98–107)
CO2 SERPL-SCNC: 29 MMOL/L (ref 21–32)
CREAT SERPL-MCNC: 0.69 MG/DL (ref 0.8–1.5)
GLOBULIN SER CALC-MCNC: 4.3 G/DL (ref 2.3–3.5)
GLUCOSE SERPL-MCNC: 79 MG/DL (ref 65–100)
POTASSIUM SERPL-SCNC: 4.3 MMOL/L (ref 3.5–5.1)
PROT SERPL-MCNC: 7.6 G/DL (ref 6.3–8.2)
SODIUM SERPL-SCNC: 141 MMOL/L (ref 136–145)
VALPROATE SERPL-MCNC: 106 UG/ML (ref 50–100)

## 2022-01-21 PROCEDURE — 80053 COMPREHEN METABOLIC PANEL: CPT

## 2022-01-21 PROCEDURE — 80164 ASSAY DIPROPYLACETIC ACD TOT: CPT

## 2022-01-21 PROCEDURE — 36415 COLL VENOUS BLD VENIPUNCTURE: CPT

## 2022-01-23 NOTE — PROGRESS NOTES
Valproic acid level stable; liver ok-CBC not done (was clinic collect rather than lab --he could redo anytime this week if possible(copy lab to Dr Toni Mccray)

## 2022-03-19 PROBLEM — M20.41 HAMMER TOE OF RIGHT FOOT: Status: ACTIVE | Noted: 2018-08-08

## 2022-03-19 PROBLEM — E03.8 CENTRAL HYPOTHYROIDISM: Status: ACTIVE | Noted: 2020-12-09

## 2022-03-19 PROBLEM — D69.59 THROMBOCYTOPENIA DUE TO DRUGS: Status: ACTIVE | Noted: 2021-01-11

## 2022-03-19 PROBLEM — K40.91 RECURRENT LEFT INGUINAL HERNIA: Status: ACTIVE | Noted: 2017-03-01

## 2022-03-19 PROBLEM — T50.905A THROMBOCYTOPENIA DUE TO DRUGS: Status: ACTIVE | Noted: 2021-01-11

## 2022-03-19 PROBLEM — E23.0 GROWTH HORMONE DEFICIENCY (HCC): Status: ACTIVE | Noted: 2021-11-16

## 2022-03-19 PROBLEM — R44.3 HALLUCINATIONS: Status: ACTIVE | Noted: 2020-01-27

## 2022-03-20 PROBLEM — Z51.81 ENCOUNTER FOR MEDICATION MONITORING: Status: ACTIVE | Noted: 2020-01-27

## 2022-06-14 DIAGNOSIS — E23.0 GROWTH HORMONE DEFICIENCY (HCC): ICD-10-CM

## 2022-06-14 DIAGNOSIS — E03.8 CENTRAL HYPOTHYROIDISM: ICD-10-CM

## 2022-06-14 DIAGNOSIS — E03.9 ACQUIRED HYPOTHYROIDISM: Primary | ICD-10-CM

## 2022-08-03 ENCOUNTER — OFFICE VISIT (OUTPATIENT)
Dept: INTERNAL MEDICINE CLINIC | Facility: CLINIC | Age: 67
End: 2022-08-03
Payer: COMMERCIAL

## 2022-08-03 VITALS
DIASTOLIC BLOOD PRESSURE: 82 MMHG | BODY MASS INDEX: 22.88 KG/M2 | WEIGHT: 151 LBS | SYSTOLIC BLOOD PRESSURE: 122 MMHG | HEIGHT: 68 IN

## 2022-08-03 DIAGNOSIS — I10 ESSENTIAL HYPERTENSION: ICD-10-CM

## 2022-08-03 DIAGNOSIS — I10 ESSENTIAL HYPERTENSION: Primary | ICD-10-CM

## 2022-08-03 DIAGNOSIS — E23.0 GROWTH HORMONE DEFICIENCY (HCC): ICD-10-CM

## 2022-08-03 DIAGNOSIS — T50.905A THROMBOCYTOPENIA DUE TO DRUGS: ICD-10-CM

## 2022-08-03 DIAGNOSIS — D69.59 THROMBOCYTOPENIA DUE TO DRUGS: ICD-10-CM

## 2022-08-03 DIAGNOSIS — F31.81 BIPOLAR 2 DISORDER (HCC): ICD-10-CM

## 2022-08-03 DIAGNOSIS — E03.8 CENTRAL HYPOTHYROIDISM: ICD-10-CM

## 2022-08-03 DIAGNOSIS — E78.00 HYPERCHOLESTEROLEMIA: ICD-10-CM

## 2022-08-03 LAB
ALBUMIN SERPL-MCNC: 3.5 G/DL (ref 3.2–4.6)
ALBUMIN/GLOB SERPL: 0.9 {RATIO} (ref 1.2–3.5)
ALP SERPL-CCNC: 52 U/L (ref 50–136)
ALT SERPL-CCNC: 20 U/L (ref 12–65)
ANION GAP SERPL CALC-SCNC: 2 MMOL/L (ref 7–16)
AST SERPL-CCNC: 12 U/L (ref 15–37)
BASOPHILS # BLD: 0.1 K/UL (ref 0–0.2)
BASOPHILS NFR BLD: 1 % (ref 0–2)
BILIRUB SERPL-MCNC: 0.5 MG/DL (ref 0.2–1.1)
BUN SERPL-MCNC: 24 MG/DL (ref 8–23)
CALCIUM SERPL-MCNC: 9.2 MG/DL (ref 8.3–10.4)
CHLORIDE SERPL-SCNC: 109 MMOL/L (ref 98–107)
CHOLEST SERPL-MCNC: 185 MG/DL
CO2 SERPL-SCNC: 30 MMOL/L (ref 21–32)
CREAT SERPL-MCNC: 0.8 MG/DL (ref 0.8–1.5)
DIFFERENTIAL METHOD BLD: ABNORMAL
EOSINOPHIL # BLD: 0.1 K/UL (ref 0–0.8)
EOSINOPHIL NFR BLD: 3 % (ref 0.5–7.8)
ERYTHROCYTE [DISTWIDTH] IN BLOOD BY AUTOMATED COUNT: 14.5 % (ref 11.9–14.6)
GLOBULIN SER CALC-MCNC: 3.9 G/DL (ref 2.3–3.5)
GLUCOSE SERPL-MCNC: 83 MG/DL (ref 65–100)
HCT VFR BLD AUTO: 47.4 % (ref 41.1–50.3)
HDLC SERPL-MCNC: 59 MG/DL (ref 40–60)
HDLC SERPL: 3.1 {RATIO}
HGB BLD-MCNC: 15.4 G/DL (ref 13.6–17.2)
IMM GRANULOCYTES # BLD AUTO: 0.1 K/UL (ref 0–0.5)
IMM GRANULOCYTES NFR BLD AUTO: 1 % (ref 0–5)
LDLC SERPL CALC-MCNC: 108.8 MG/DL
LYMPHOCYTES # BLD: 2.1 K/UL (ref 0.5–4.6)
LYMPHOCYTES NFR BLD: 47 % (ref 13–44)
MCH RBC QN AUTO: 32 PG (ref 26.1–32.9)
MCHC RBC AUTO-ENTMCNC: 32.5 G/DL (ref 31.4–35)
MCV RBC AUTO: 98.3 FL (ref 79.6–97.8)
MONOCYTES # BLD: 0.6 K/UL (ref 0.1–1.3)
MONOCYTES NFR BLD: 14 % (ref 4–12)
NEUTS SEG # BLD: 1.5 K/UL (ref 1.7–8.2)
NEUTS SEG NFR BLD: 34 % (ref 43–78)
NRBC # BLD: 0 K/UL (ref 0–0.2)
PLATELET # BLD AUTO: 144 K/UL (ref 150–450)
PMV BLD AUTO: 9.9 FL (ref 9.4–12.3)
POTASSIUM SERPL-SCNC: 5 MMOL/L (ref 3.5–5.1)
PROT SERPL-MCNC: 7.4 G/DL (ref 6.3–8.2)
RBC # BLD AUTO: 4.82 M/UL (ref 4.23–5.6)
SODIUM SERPL-SCNC: 141 MMOL/L (ref 138–145)
T4 FREE SERPL-MCNC: 0.9 NG/DL (ref 0.78–1.46)
TRIGL SERPL-MCNC: 86 MG/DL (ref 35–150)
VALPROATE SERPL-MCNC: 104 UG/ML (ref 50–100)
VIT B12 SERPL-MCNC: 1041 PG/ML (ref 193–986)
VLDLC SERPL CALC-MCNC: 17.2 MG/DL (ref 6–23)
WBC # BLD AUTO: 4.5 K/UL (ref 4.3–11.1)

## 2022-08-03 PROCEDURE — 3017F COLORECTAL CA SCREEN DOC REV: CPT | Performed by: INTERNAL MEDICINE

## 2022-08-03 PROCEDURE — G8427 DOCREV CUR MEDS BY ELIG CLIN: HCPCS | Performed by: INTERNAL MEDICINE

## 2022-08-03 PROCEDURE — G8420 CALC BMI NORM PARAMETERS: HCPCS | Performed by: INTERNAL MEDICINE

## 2022-08-03 PROCEDURE — 99213 OFFICE O/P EST LOW 20 MIN: CPT | Performed by: INTERNAL MEDICINE

## 2022-08-03 PROCEDURE — 1123F ACP DISCUSS/DSCN MKR DOCD: CPT | Performed by: INTERNAL MEDICINE

## 2022-08-03 PROCEDURE — 1036F TOBACCO NON-USER: CPT | Performed by: INTERNAL MEDICINE

## 2022-08-03 RX ORDER — VENLAFAXINE HYDROCHLORIDE 75 MG/1
75 CAPSULE, EXTENDED RELEASE ORAL DAILY
COMMUNITY

## 2022-08-03 RX ORDER — TIMOLOL MALEATE 5 MG/ML
SOLUTION/ DROPS OPHTHALMIC
COMMUNITY
Start: 2022-06-06

## 2022-08-03 ASSESSMENT — ENCOUNTER SYMPTOMS
COUGH: 0
SHORTNESS OF BREATH: 0

## 2022-08-03 NOTE — PROGRESS NOTES
SUBJECTIVE:   Taylor Corley is a 74 y.o. male seen for a visit regarding   Chief Complaint   Patient presents with    Cholesterol Problem    Thyroid Problem     Check up-fasting        Hyperlipidemia  This is a chronic problem. The current episode started more than 1 year ago. The problem is controlled. Recent lipid tests were reviewed and are normal. Pertinent negatives include no shortness of breath. Current antihyperlipidemic treatment includes statins. Other  This is a chronic problem. The current episode started more than 1 year ago. The problem occurs constantly (has pituitary hypothyroid and sees endo--T4 was normal). The problem has been unchanged. Pertinent negatives include no coughing. Past Medical History, Past Surgical History, Family history, Social History, and Medications were all reviewed with the patient today and updated as necessary. Current Outpatient Medications   Medication Sig Dispense Refill    venlafaxine (EFFEXOR XR) 75 MG extended release capsule Take 75 mg by mouth in the morning. timolol (TIMOPTIC) 0.5 % ophthalmic solution INSTILL 1 DROP INTO LEFT EYE ONCE DAILY      atorvastatin (LIPITOR) 20 MG tablet Take 20 mg by mouth daily      divalproex (DEPAKOTE ER) 250 MG extended release tablet TAKE 3 TABLETS BY MOUTH TWICE A DAY      levothyroxine (SYNTHROID) 75 MCG tablet Take 75 mcg by mouth every morning (before breakfast)      mirtazapine (REMERON) 15 MG tablet Take 15 mg by mouth nightly      QUEtiapine (SEROQUEL) 100 MG tablet TAKE 1 TABLET BY MOUTH EVERY EVENING       No current facility-administered medications for this visit.      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 Respiratory:  Negative for cough and shortness of breath. Musculoskeletal:  Positive for gait problem (ok level ground -unsteady if on grass or uneven ground-no falls). Hematological:  Does not bruise/bleed easily. Psychiatric/Behavioral:  Negative for dysphoric mood and sleep disturbance. OBJECTIVE:  /82   Ht 5' 7.5\" (1.715 m)   Wt 151 lb (68.5 kg)   BMI 23.30 kg/m²      Physical Exam  Constitutional:       General: He is not in acute distress. Appearance: Normal appearance. Cardiovascular:      Rate and Rhythm: Normal rate and regular rhythm. Heart sounds: Normal heart sounds. No murmur heard. Pulmonary:      Breath sounds: No wheezing or rales. Medical problems and test results were reviewed with the patient today. ASSESSMENT and PLAN     1. Essential hypertension  -     CBC with Auto Differential; Future  -     Comprehensive Metabolic Panel; Future  2. Central hypothyroidism  3. 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 Bipolar 2 disorder (HCC)  -     Valproic Acid Level, Total; Future  5. Hypercholesterolemia  -     Lipid Panel; Future   Endo follows pituitary-Dr Nadia Schneider the bipolar-platelets have been stable -the BP good; check lipid-copy lab to psych  Current treatment plan is effective. no changes in therapy  lab results and schedule for future lab studies reviewed with patient  Cardiovascular risk and specific lipid/ LDL goals reviewed  reviewed medications and side effects in detail     No follow-ups on file.

## 2022-08-05 LAB — IGF-I SERPL-MCNC: 55 NG/ML (ref 64–240)

## 2022-09-27 ENCOUNTER — TELEPHONE (OUTPATIENT)
Dept: INTERNAL MEDICINE CLINIC | Facility: CLINIC | Age: 67
End: 2022-09-27

## 2022-09-27 DIAGNOSIS — E78.00 PURE HYPERCHOLESTEROLEMIA, UNSPECIFIED: ICD-10-CM

## 2022-09-27 RX ORDER — ATORVASTATIN CALCIUM 20 MG/1
TABLET, FILM COATED ORAL
Qty: 90 TABLET | Refills: 3 | OUTPATIENT
Start: 2022-09-27

## 2022-09-27 RX ORDER — ATORVASTATIN CALCIUM 20 MG/1
20 TABLET, FILM COATED ORAL DAILY
Qty: 90 TABLET | Refills: 3 | Status: SHIPPED | OUTPATIENT
Start: 2022-09-27

## 2022-09-27 NOTE — TELEPHONE ENCOUNTER
Patient requesting refill for Atorvastatin to be sent to [facility] on [facility]. Patient is completely out of medication. Patient is requesting a longer dosage such as 60 days - 90 days.  Please call patient to let him know if he is able to receive are larger amount

## 2022-10-10 ENCOUNTER — TELEPHONE (OUTPATIENT)
Dept: INTERNAL MEDICINE CLINIC | Facility: CLINIC | Age: 67
End: 2022-10-10

## 2022-10-10 NOTE — TELEPHONE ENCOUNTER
The patient is having a cough and he would like to know what to do. I suggested him to make an appointment, but he prefers to have Alexa Felder to call him back at [phone].

## 2022-10-10 NOTE — TELEPHONE ENCOUNTER
Spoke with patient's wife and reports cough with chest congestion. Denies any fever or sinus issues. Asked if been tested for COVID and has not.   Wife notified would have to be tested and call with results/TD

## 2022-10-12 ENCOUNTER — TELEPHONE (OUTPATIENT)
Dept: INTERNAL MEDICINE CLINIC | Facility: CLINIC | Age: 67
End: 2022-10-12

## 2022-10-12 ENCOUNTER — TELEMEDICINE (OUTPATIENT)
Dept: INTERNAL MEDICINE CLINIC | Facility: CLINIC | Age: 67
End: 2022-10-12
Payer: MEDICARE

## 2022-10-12 DIAGNOSIS — J06.9 UPPER RESPIRATORY TRACT INFECTION, UNSPECIFIED TYPE: Primary | ICD-10-CM

## 2022-10-12 PROCEDURE — 99442 PR PHYS/QHP TELEPHONE EVALUATION 11-20 MIN: CPT | Performed by: INTERNAL MEDICINE

## 2022-10-12 RX ORDER — GUAIFENESIN AND CODEINE PHOSPHATE 100; 10 MG/5ML; MG/5ML
5 SOLUTION ORAL 3 TIMES DAILY PRN
Qty: 90 ML | Refills: 0 | Status: SHIPPED | OUTPATIENT
Start: 2022-10-12 | End: 2022-10-15

## 2022-10-12 ASSESSMENT — ENCOUNTER SYMPTOMS
RHINORRHEA: 0
COUGH: 1
SORE THROAT: 1

## 2022-10-12 NOTE — PROGRESS NOTES
SUBJECTIVE:   Patricia Yousif is a 74 y.o. male seen for a visit regarding   Chief Complaint   Patient presents with    Cough        Cough  This is a new problem. The current episode started in the past 7 days (notes cough bad about 5d ago and persists, not much mucus; saw urgent care yesterday-told viral-did not test since outside 5d window-steroid was given). The problem has been unchanged (this may have been 2 weeks long). Associated symptoms include a sore throat (initial, now gone). Pertinent negatives include no fever, myalgias or rhinorrhea. Past Medical History, Past Surgical History, Family history, Social History, and Medications were all reviewed with the patient today and updated as necessary. Current Outpatient Medications   Medication Sig Dispense Refill    guaiFENesin-codeine (TUSSI-ORGANIDIN NR) 100-10 MG/5ML syrup Take 5 mLs by mouth 3 times daily as needed for Cough for up to 3 days. 90 mL 0    atorvastatin (LIPITOR) 20 MG tablet Take 1 tablet by mouth daily 90 tablet 3    venlafaxine (EFFEXOR XR) 75 MG extended release capsule Take 75 mg by mouth in the morning. timolol (TIMOPTIC) 0.5 % ophthalmic solution INSTILL 1 DROP INTO LEFT EYE ONCE DAILY      divalproex (DEPAKOTE ER) 250 MG extended release tablet TAKE 3 TABLETS BY MOUTH TWICE A DAY      levothyroxine (SYNTHROID) 75 MCG tablet Take 75 mcg by mouth every morning (before breakfast)      mirtazapine (REMERON) 15 MG tablet Take 15 mg by mouth nightly      QUEtiapine (SEROQUEL) 100 MG tablet TAKE 1 TABLET BY MOUTH EVERY EVENING       No current facility-administered medications for this visit.      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 HENT:  Positive for sore throat (initial, now gone). Negative for congestion and rhinorrhea. Respiratory:  Positive for cough. Musculoskeletal:  Negative for myalgias. OBJECTIVE:  There were no vitals taken for this visit. Physical Exam  Pulmonary:      Effort: Pulmonary effort is normal.      Comments: Raspy cough on audio         Medical problems and test results were reviewed with the patient today. ASSESSMENT and PLAN     1. Upper respiratory tract infection, unspecified type  -     guaiFENesin-codeine (TUSSI-ORGANIDIN NR) 100-10 MG/5ML syrup; Take 5 mLs by mouth 3 times daily as needed for Cough for up to 3 days. , Disp-90 mL, R-0Normal   Seen urgent care for dx -instructed to fill steroid given -gave codeine for prn for cough  reviewed medications and side effects in detail   I was at office while conducting this encounter. Pt at home  Consent:  He and/or his healthcare decision maker is aware that this patient-initiated Telehealth encounter is a billable service, with coverage as determined by his insurance carrier. He is aware that he may receive a bill and has provided verbal consent to proceed: Yes    This virtual visit was conducted telephone    Total Time: 11-20 minutes- 12 min      No follow-ups on file.

## 2022-10-12 NOTE — TELEPHONE ENCOUNTER
----- Message from Stefanie Oneal sent at 10/12/2022 10:16 AM EDT -----  Subject: Message to Provider    QUESTIONS  Information for Provider? Patient went to Urgent Care 10/11 around 5PM.   per  patient has a heart murmur. Patient wants to know if he can go to   the [facility] on 10/15 due to he has a cough. He wants to know if this is   contagious. Please call & verify.  ---------------------------------------------------------------------------  --------------  Zora Bosworth [facility]  [phone]; OK to leave message on voicemail  ---------------------------------------------------------------------------  --------------  SCRIPT ANSWERS  Relationship to Patient?  Self

## 2022-10-13 ENCOUNTER — OFFICE VISIT (OUTPATIENT)
Dept: INTERNAL MEDICINE CLINIC | Facility: CLINIC | Age: 67
End: 2022-10-13
Payer: MEDICARE

## 2022-10-13 VITALS
WEIGHT: 151 LBS | SYSTOLIC BLOOD PRESSURE: 116 MMHG | BODY MASS INDEX: 22.88 KG/M2 | DIASTOLIC BLOOD PRESSURE: 84 MMHG | HEIGHT: 68 IN

## 2022-10-13 DIAGNOSIS — I35.8 AORTIC DIASTOLIC MURMUR: Primary | ICD-10-CM

## 2022-10-13 DIAGNOSIS — J06.9 UPPER RESPIRATORY TRACT INFECTION, UNSPECIFIED TYPE: ICD-10-CM

## 2022-10-13 PROCEDURE — 3017F COLORECTAL CA SCREEN DOC REV: CPT | Performed by: INTERNAL MEDICINE

## 2022-10-13 PROCEDURE — 1123F ACP DISCUSS/DSCN MKR DOCD: CPT | Performed by: INTERNAL MEDICINE

## 2022-10-13 PROCEDURE — 99214 OFFICE O/P EST MOD 30 MIN: CPT | Performed by: INTERNAL MEDICINE

## 2022-10-13 PROCEDURE — G8484 FLU IMMUNIZE NO ADMIN: HCPCS | Performed by: INTERNAL MEDICINE

## 2022-10-13 PROCEDURE — G8420 CALC BMI NORM PARAMETERS: HCPCS | Performed by: INTERNAL MEDICINE

## 2022-10-13 PROCEDURE — 1036F TOBACCO NON-USER: CPT | Performed by: INTERNAL MEDICINE

## 2022-10-13 PROCEDURE — 93000 ELECTROCARDIOGRAM COMPLETE: CPT | Performed by: INTERNAL MEDICINE

## 2022-10-13 PROCEDURE — G8427 DOCREV CUR MEDS BY ELIG CLIN: HCPCS | Performed by: INTERNAL MEDICINE

## 2022-10-13 RX ORDER — PREDNISONE 20 MG/1
TABLET ORAL
COMMUNITY
Start: 2022-10-11 | End: 2022-10-13 | Stop reason: ALTCHOICE

## 2022-10-13 RX ORDER — BENZONATATE 100 MG/1
100 CAPSULE ORAL 3 TIMES DAILY PRN
COMMUNITY
Start: 2022-10-11

## 2022-10-13 ASSESSMENT — ENCOUNTER SYMPTOMS
COUGH: 1
SHORTNESS OF BREATH: 0
WHEEZING: 0

## 2022-10-13 NOTE — PROGRESS NOTES
SUBJECTIVE:   Vladimir Farias is a 74 y.o. male seen for a visit regarding   Chief Complaint   Patient presents with    Heart Murmur     Was told that by Dr at urgent care a few days ago        Heart Murmur  This is a new problem. The current episode started yesterday (told had a heart murmer by urgent care yesterday-  cough started maybe 10 d ago-5 yo was there 9/29 and stayed 5 d--urgent care gave medrol; felt was viral and we gave codeine yesterday-covid not tested at urgent care since out of isolation window). The problem occurs constantly. Associated symptoms include coughing. Pertinent negatives include no chest pain or fever. Past Medical History, Past Surgical History, Family history, Social History, and Medications were all reviewed with the patient today and updated as necessary. Current Outpatient Medications   Medication Sig Dispense Refill    benzonatate (TESSALON) 100 MG capsule Take 100 mg by mouth 3 times daily as needed      guaiFENesin-codeine (TUSSI-ORGANIDIN NR) 100-10 MG/5ML syrup Take 5 mLs by mouth 3 times daily as needed for Cough for up to 3 days. 90 mL 0    atorvastatin (LIPITOR) 20 MG tablet Take 1 tablet by mouth daily 90 tablet 3    venlafaxine (EFFEXOR XR) 75 MG extended release capsule Take 75 mg by mouth in the morning. timolol (TIMOPTIC) 0.5 % ophthalmic solution INSTILL 1 DROP INTO LEFT EYE ONCE DAILY      divalproex (DEPAKOTE ER) 250 MG extended release tablet TAKE 3 TABLETS BY MOUTH TWICE A DAY      levothyroxine (SYNTHROID) 75 MCG tablet Take 75 mcg by mouth every morning (before breakfast)      mirtazapine (REMERON) 15 MG tablet Take 15 mg by mouth nightly      QUEtiapine (SEROQUEL) 100 MG tablet TAKE 1 TABLET BY MOUTH EVERY EVENING       No current facility-administered medications for this visit.      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 Respiratory:  Positive for cough. Negative for shortness of breath and wheezing. Walks 2.5 mi -no sx   Cardiovascular:  Negative for chest pain and palpitations. OBJECTIVE:  /84   Ht 5' 7.5\" (1.715 m)   Wt 151 lb (68.5 kg)   BMI 23.30 kg/m²      Physical Exam  Constitutional:       General: He is not in acute distress. Appearance: Normal appearance. Cardiovascular:      Rate and Rhythm: Normal rate and regular rhythm. Heart sounds: Murmur heard. Gallop: gr 2/6 blowing diastolic murmur -localized at aortic area --best heard sitting -very faint and hard to hear when supine. Pulmonary:      Breath sounds: No wheezing, rhonchi or rales. Musculoskeletal:      Right lower leg: Edema present. Left lower leg: Edema present. Comments: 1 plus edema bilateral          Medical problems and test results were reviewed with the patient today. ASSESSMENT and PLAN     1. Aortic diastolic murmur  -     EKG 12 Lead  -     MD Milagros, Cardiology, Radha  -     Transthoracic echocardiogram (TTE) complete with contrast, bubble, strain, and 3D PRN; Future  2.  Upper respiratory tract infection, unspecified type   Has medrol sabina -use it as 3x3d, 2x4d, 1x4d-the codeine still ok-this for the cough from tracheitis-the murmur needs further eval --EKG with LBBB, 1 deg AVB also (not present on a rhythm strip few years ago at hospital-)-no sx from this but see cardiology and get echo also  reviewed medications and side effects in detail     No follow-ups on file.

## 2022-10-14 ENCOUNTER — TELEPHONE (OUTPATIENT)
Dept: INTERNAL MEDICINE CLINIC | Facility: CLINIC | Age: 67
End: 2022-10-14

## 2022-10-14 NOTE — TELEPHONE ENCOUNTER
Patient called in requesting information on how he needs to be taking the medication prescribed by urgent care and Dr King Campoverde. The patient was given Prednisone 20 mg by Urgent Care and was under the impression that Dr King Campoverde was giving something different. Per Dr Mary Abdalla instructions, the patient needs to take 1 20 mg tablet of Prednisone for 4 days, then take 10 mg (1 tab in half) for 4 days. If symptoms persist, call the office to let us know.

## 2022-10-14 NOTE — TELEPHONE ENCOUNTER
I left pt's wife Olen Nyhan the message from Dr April Solomon re: letting her know pt should take 20 mg daily for 5 days then decrease to 1/2 tablet x 5 days

## 2022-10-14 NOTE — TELEPHONE ENCOUNTER
Pt's wife is asking for advise on pt's Prednisone dosing. that pt was given at urgent care . Per Cowan said he was told to take 20 mg one tab tab BID x 5 days and she is asking if that is correct ?

## 2022-10-17 ENCOUNTER — TELEPHONE (OUTPATIENT)
Dept: INTERNAL MEDICINE CLINIC | Facility: CLINIC | Age: 67
End: 2022-10-17

## 2022-10-17 NOTE — TELEPHONE ENCOUNTER
Patient states that he was instructed to get an echocardiogram done.  Patient would like a referral specifically for Dr Gaetano Mackey

## 2022-11-12 NOTE — PROGRESS NOTES
[facility] CARDIOLOGY  [address] [facility], [address]  PHONE: [phone]        11/15/22        NAME:  Vladimir Farias  : 1955  MRN: [id number]     CHIEF COMPLAINT:    Heart Murmur      SUBJECTIVE:     Referred for a heart murmur. Asymptomatic. No cp or sob or palpitation. Phx:  Bipolar  Retinal detachment. Fhx:  Father had aMI    Shx:  Non smoker    Ros:  negative           Medications were all reviewed with the patient today and updated as necessary. Current Outpatient Medications   Medication Sig    atorvastatin (LIPITOR) 20 MG tablet Take 1 tablet by mouth daily    venlafaxine (EFFEXOR XR) 75 MG extended release capsule Take 75 mg by mouth in the morning. timolol (TIMOPTIC) 0.5 % ophthalmic solution INSTILL 1 DROP INTO LEFT EYE ONCE DAILY    divalproex (DEPAKOTE ER) 250 MG extended release tablet TAKE 3 TABLETS BY MOUTH TWICE A DAY    levothyroxine (SYNTHROID) 75 MCG tablet Take 75 mcg by mouth every morning (before breakfast)    mirtazapine (REMERON) 15 MG tablet Take 15 mg by mouth nightly    QUEtiapine (SEROQUEL) 100 MG tablet TAKE 1 TABLET BY MOUTH EVERY EVENING    benzonatate (TESSALON) 100 MG capsule Take 100 mg by mouth 3 times daily as needed (Patient not taking: Reported on 11/15/2022)     No current facility-administered medications for this visit. No Known Allergies        PHYSICAL EXAM:     Wt Readings from Last 3 Encounters:   11/15/22 149 lb (67.6 kg)   10/13/22 151 lb (68.5 kg)   22 151 lb (68.5 kg)     BP Readings from Last 3 Encounters:   11/15/22 126/82   10/13/22 116/84   22 122/82       /82   Pulse 60   Ht 5' 7.5\" (1.715 m)   Wt 149 lb (67.6 kg)   BMI 22.99 kg/m²     Physical Exam  Vitals reviewed. HENT:      Head: Normocephalic and atraumatic. Eyes:      Extraocular Movements: Extraocular movements intact. Pupils: Pupils are equal, round, and reactive to light.    Cardiovascular:      Rate and Rhythm: Normal rate. Heart sounds: Normal heart sounds. Comments: Kyphotic sternum  Soft ejection murmur  Pulmonary:      Effort: Pulmonary effort is normal.      Breath sounds: Normal breath sounds. Abdominal:      General: Abdomen is flat. Palpations: Abdomen is soft. There is no mass. Musculoskeletal:         General: Normal range of motion. Cervical back: Normal range of motion. Skin:     General: Skin is warm and dry. Neurological:      General: No focal deficit present. Mental Status: He is alert and oriented to person, place, and time. Psychiatric:         Mood and Affect: Mood normal.         RECENT LABS AND RECORDS REVIEW    Ekg : LBBB  Tchol 185, ldl 108      ASSESSMENT and PLAN    Yuliya Almanzar was seen today for heart murmur. Diagnoses and all orders for this visit:    Murmur, cardiac  -     Transthoracic echocardiogram (TTE) complete with contrast, bubble, strain, and 3D PRN; Future    LBBB (left bundle branch block)  -     Transthoracic echocardiogram (TTE) complete with contrast, bubble, strain, and 3D PRN; Future    Family history of ASCVD (arteriosclerotic cardiovascular disease)    Other idiopathic scoliosis, unspecified spinal region     ////      Return in about 1 year (around 11/15/2023).        María Estrada MD  11/15/2022  9:31 AM

## 2022-11-14 ENCOUNTER — TELEPHONE (OUTPATIENT)
Dept: ENDOCRINOLOGY | Age: 67
End: 2022-11-14

## 2022-11-14 DIAGNOSIS — E23.0 GROWTH HORMONE DEFICIENCY (HCC): ICD-10-CM

## 2022-11-14 DIAGNOSIS — E23.0 HYPOPITUITARISM (HCC): ICD-10-CM

## 2022-11-14 DIAGNOSIS — E03.9 ACQUIRED HYPOTHYROIDISM: Primary | ICD-10-CM

## 2022-11-15 ENCOUNTER — INITIAL CONSULT (OUTPATIENT)
Dept: CARDIOLOGY CLINIC | Age: 67
End: 2022-11-15
Payer: COMMERCIAL

## 2022-11-15 VITALS
HEIGHT: 68 IN | WEIGHT: 149 LBS | HEART RATE: 60 BPM | SYSTOLIC BLOOD PRESSURE: 126 MMHG | BODY MASS INDEX: 22.58 KG/M2 | DIASTOLIC BLOOD PRESSURE: 82 MMHG

## 2022-11-15 DIAGNOSIS — M41.20 OTHER IDIOPATHIC SCOLIOSIS, UNSPECIFIED SPINAL REGION: ICD-10-CM

## 2022-11-15 DIAGNOSIS — Z82.49 FAMILY HISTORY OF ASCVD (ARTERIOSCLEROTIC CARDIOVASCULAR DISEASE): ICD-10-CM

## 2022-11-15 DIAGNOSIS — R01.1 MURMUR, CARDIAC: Primary | ICD-10-CM

## 2022-11-15 DIAGNOSIS — I44.7 LBBB (LEFT BUNDLE BRANCH BLOCK): ICD-10-CM

## 2022-11-15 DIAGNOSIS — E23.0 HYPOPITUITARISM (HCC): ICD-10-CM

## 2022-11-15 DIAGNOSIS — E03.9 ACQUIRED HYPOTHYROIDISM: ICD-10-CM

## 2022-11-15 DIAGNOSIS — E23.0 GROWTH HORMONE DEFICIENCY (HCC): ICD-10-CM

## 2022-11-15 LAB
ALBUMIN SERPL-MCNC: 3.3 G/DL (ref 3.2–4.6)
ALBUMIN/GLOB SERPL: 1 {RATIO} (ref 0.4–1.6)
ALP SERPL-CCNC: 48 U/L (ref 50–136)
ALT SERPL-CCNC: 26 U/L (ref 12–65)
ANION GAP SERPL CALC-SCNC: 3 MMOL/L (ref 2–11)
AST SERPL-CCNC: 17 U/L (ref 15–37)
BILIRUB SERPL-MCNC: 0.5 MG/DL (ref 0.2–1.1)
BUN SERPL-MCNC: 23 MG/DL (ref 8–23)
CALCIUM SERPL-MCNC: 9.2 MG/DL (ref 8.3–10.4)
CHLORIDE SERPL-SCNC: 110 MMOL/L (ref 101–110)
CO2 SERPL-SCNC: 30 MMOL/L (ref 21–32)
CREAT SERPL-MCNC: 0.7 MG/DL (ref 0.8–1.5)
GLOBULIN SER CALC-MCNC: 3.3 G/DL (ref 2.8–4.5)
GLUCOSE SERPL-MCNC: 82 MG/DL (ref 65–100)
POTASSIUM SERPL-SCNC: 4.6 MMOL/L (ref 3.5–5.1)
PROT SERPL-MCNC: 6.6 G/DL (ref 6.3–8.2)
SODIUM SERPL-SCNC: 143 MMOL/L (ref 133–143)
T4 FREE SERPL-MCNC: 1 NG/DL (ref 0.78–1.46)

## 2022-11-15 PROCEDURE — G8484 FLU IMMUNIZE NO ADMIN: HCPCS | Performed by: INTERNAL MEDICINE

## 2022-11-15 PROCEDURE — 3017F COLORECTAL CA SCREEN DOC REV: CPT | Performed by: INTERNAL MEDICINE

## 2022-11-15 PROCEDURE — 3074F SYST BP LT 130 MM HG: CPT | Performed by: INTERNAL MEDICINE

## 2022-11-15 PROCEDURE — 1123F ACP DISCUSS/DSCN MKR DOCD: CPT | Performed by: INTERNAL MEDICINE

## 2022-11-15 PROCEDURE — G8420 CALC BMI NORM PARAMETERS: HCPCS | Performed by: INTERNAL MEDICINE

## 2022-11-15 PROCEDURE — 1036F TOBACCO NON-USER: CPT | Performed by: INTERNAL MEDICINE

## 2022-11-15 PROCEDURE — 99203 OFFICE O/P NEW LOW 30 MIN: CPT | Performed by: INTERNAL MEDICINE

## 2022-11-15 PROCEDURE — G8428 CUR MEDS NOT DOCUMENT: HCPCS | Performed by: INTERNAL MEDICINE

## 2022-11-15 PROCEDURE — 3078F DIAST BP <80 MM HG: CPT | Performed by: INTERNAL MEDICINE

## 2022-11-16 LAB — IGF-I SERPL-MCNC: 42 NG/ML (ref 64–240)

## 2022-11-17 ENCOUNTER — TELEPHONE (OUTPATIENT)
Dept: CARDIOLOGY CLINIC | Age: 67
End: 2022-11-17

## 2022-11-17 NOTE — TELEPHONE ENCOUNTER
Spoke with Mrs. Malik. Mrs. Malik inquiring if patient's echo will be covered by insurance. CPT 78799 for echo. Patient will call the insurance.

## 2022-11-22 ENCOUNTER — OFFICE VISIT (OUTPATIENT)
Dept: ENDOCRINOLOGY | Age: 67
End: 2022-11-22
Payer: MEDICARE

## 2022-11-22 VITALS
OXYGEN SATURATION: 94 % | BODY MASS INDEX: 23.34 KG/M2 | WEIGHT: 149 LBS | DIASTOLIC BLOOD PRESSURE: 60 MMHG | HEART RATE: 79 BPM | SYSTOLIC BLOOD PRESSURE: 118 MMHG

## 2022-11-22 DIAGNOSIS — M85.89 OSTEOPENIA OF MULTIPLE SITES: ICD-10-CM

## 2022-11-22 DIAGNOSIS — E03.8 CENTRAL HYPOTHYROIDISM: ICD-10-CM

## 2022-11-22 DIAGNOSIS — E23.0 GROWTH HORMONE DEFICIENCY (HCC): ICD-10-CM

## 2022-11-22 DIAGNOSIS — E23.0 HYPOPITUITARISM (HCC): ICD-10-CM

## 2022-11-22 DIAGNOSIS — D35.2 BENIGN NEOPLASM OF PITUITARY GLAND (HCC): Primary | ICD-10-CM

## 2022-11-22 PROCEDURE — 3078F DIAST BP <80 MM HG: CPT | Performed by: INTERNAL MEDICINE

## 2022-11-22 PROCEDURE — 3017F COLORECTAL CA SCREEN DOC REV: CPT | Performed by: INTERNAL MEDICINE

## 2022-11-22 PROCEDURE — 99214 OFFICE O/P EST MOD 30 MIN: CPT | Performed by: INTERNAL MEDICINE

## 2022-11-22 PROCEDURE — G8420 CALC BMI NORM PARAMETERS: HCPCS | Performed by: INTERNAL MEDICINE

## 2022-11-22 PROCEDURE — G8427 DOCREV CUR MEDS BY ELIG CLIN: HCPCS | Performed by: INTERNAL MEDICINE

## 2022-11-22 PROCEDURE — G8484 FLU IMMUNIZE NO ADMIN: HCPCS | Performed by: INTERNAL MEDICINE

## 2022-11-22 PROCEDURE — 3074F SYST BP LT 130 MM HG: CPT | Performed by: INTERNAL MEDICINE

## 2022-11-22 PROCEDURE — 1036F TOBACCO NON-USER: CPT | Performed by: INTERNAL MEDICINE

## 2022-11-22 PROCEDURE — 1123F ACP DISCUSS/DSCN MKR DOCD: CPT | Performed by: INTERNAL MEDICINE

## 2022-11-22 RX ORDER — LEVOTHYROXINE SODIUM 0.07 MG/1
75 TABLET ORAL
Qty: 90 TABLET | Refills: 3 | Status: SHIPPED | OUTPATIENT
Start: 2022-11-22

## 2022-11-22 RX ORDER — CHLORHEXIDINE GLUCONATE 0.12 MG/ML
RINSE ORAL
COMMUNITY
Start: 2022-11-13

## 2022-11-22 NOTE — PROGRESS NOTES
David Larry MD, [address]            Reason for visit: Follow-up of hypopituitarism      ASSESSMENT AND PLAN:    1. Benign neoplasm of pituitary gland [facility])  He has clear evidence of central hypothyroidism. Free T4 is in the normal range. MRI in 2020 demonstrated a small pituitary adenoma. Repeat MRI in 2020 was normal.  Cosyntropin stimulation test in 2020 was normal, excluding adrenal insufficiency. He also has a low IGF-I level and probably has growth hormone deficiency. He has no attributable symptoms, and this requires no treatment. Follow up in 6 months with repeat labs. - T4, Free; Future  - Insulin-Like Growth Factor; Future  - Comprehensive Metabolic Panel; Future    2. Hypopituitarism ([address]    3. Central hypothyroidism  He is biochemically euthyroid. In patients with central hypothyroidism, only free T4 should be checked. TSH should not be checked, as it is unreliable in this setting and confuses the clinical picture. - levothyroxine (SYNTHROID) 75 MCG tablet; Take 1 tablet by mouth every morning (before breakfast)  Dispense: 90 tablet; Refill: 3    4. Growth hormone deficiency ([address]  As above. 5. Osteopenia of multiple sites        Follow-up and Dispositions    Return in about 6 months (around 2023). History of Present Illness:    PITUITARY DISEASE  Niesha Ivory is here for follow-up of probable hypopituitarism. He was diagnosed with hypothyroidism in the late s or early s. He took lithium (for treatment of bipolar disorder) for many years and stopped it in late . He was a patient at psychiatric facility in late  or early . Brain MRI at that time demonstrated a pituitary tumor (see below). This was not further evaluated at that time.      Symptoms: See review of systems below     Fertility: He has 3 children with no history of infertility     Imagin2020: MRI brain ([facility])- 5.1 x 5.6 mm well-defined nodule within the mid posterior aspect of the sella turcica, likely a pituitary adenoma. 12/22/2020: MRI pituitary ([address])- normal pituitary. 4/13/2021: DXA ([address])- L1/3/4 BMD 1.054/T-score -1.5, left hip BMD 0.806/T-score -2.0, right distal 33% radius BMD 0.906/T-score -0.8. Laboratory evaluation:  2/11/2016: TSH 3.410.  4/19/2017: TSH 0.684, free T4 1.05.  6/28/2017: TSH 2.120, free T4 0.88.  8/8/2018: TSH 0.359, free T4 1.10.  1/29/2019: TSH 0.774.  1/27/2020: TSH 0.014.  7/10/2020: TSH 0.037, free T4 0.80.  8/21/2020 at 10:21 AM: Free T4 0.6, cortisol 6.2, prolactin 5.6 (off of levothyroxine for a few days prior to this test). 9/25/2020 at 11:02 AM: TSH 2.510, free T4 0.59, testosterone 775, LH 2.9, cortisol 9.9, ACTH 13.1, IGF-I 49 (off of thyroid hormone since mid-August 2020). 10/28/2020 at 4:31 PM: TSH 0.041, free T4 1.11, free T4 by dialysis 1.4, T4 5.5, T3 92, antithyroid peroxidase antibodies 50 (+), IGF-I 42, cortisol 5.0.  12/3/2020 at 8:05 AM: TSH 3.020, free T4 0.57, free T4 by dialysis 0.65, IGF-I 56, cortisol 10.8.  12/15/2020: Cosyntropin stimulation test- cortisol 22.0 (30 minutes) --> 26.0 (60 minutes). 3/9/2021: Free T4 1.17, IGF-I 37.  9/9/2021: Free T4 1.18, IGF-I 63.  5/12/2022: Free T4 1. 17.  8/3/2022: Free T4 0.9, IGF-I 55.  11/15/2022: Free T4 1.0, IGF-I 42. Prior/current treatment:   Adrenal: none  Growth hormone: none  Testosterone: none  Thyroid: Levothyroxine was started many years ago. His dose has been adjusted many times. He stopped levothyroxine mid-August 2020. He started levothyroxine 75 mcg daily 10/6/2020. Levothyroxine was discontinued 11/6/2020. Levothyroxine 75 mcg daily was resumed 12/15/2020. ADH: none    Review of Systems   Constitutional:  Negative for fatigue and unexpected weight change (stable). Cardiovascular:  Negative for palpitations. Neurological:  Positive for tremors. /60   Pulse 79   Wt 149 lb (67.6 kg)   SpO2 94%   BMI 23.34 kg/m²   Wt Readings from Last 3 Encounters:   11/22/22 149 lb (67.6 kg)   11/22/22 149 lb (67.6 kg)   11/15/22 149 lb (67.6 kg)       Physical Exam  Constitutional:       Appearance: Normal appearance. HENT:      Head: Normocephalic. Neck:      Thyroid: No thyroid mass or thyromegaly. Cardiovascular:      Rate and Rhythm: Normal rate and regular rhythm. Pulmonary:      Effort: Pulmonary effort is normal.      Breath sounds: Normal breath sounds. Neurological:      Mental Status: He is alert. Psychiatric:         Mood and Affect: Mood normal.         Behavior: Behavior normal.       Orders Placed This Encounter   Procedures    T4, Free     Standing Status:   Future     Standing Expiration Date:   11/22/2023    Insulin-Like Growth Factor     Standing Status:   Future     Standing Expiration Date:   11/22/2023    Comprehensive Metabolic Panel     Standing Status:   Future     Standing Expiration Date:   11/22/2023         Current Outpatient Medications   Medication Sig Dispense Refill    chlorhexidine (PERIDEX) 0.12 % solution SWISH 15 ML FOR 30 SECONDS, THEN SPIT, TWICE A DAY      levothyroxine (SYNTHROID) 75 MCG tablet Take 1 tablet by mouth every morning (before breakfast) 90 tablet 3    atorvastatin (LIPITOR) 20 MG tablet Take 1 tablet by mouth daily 90 tablet 3    venlafaxine (EFFEXOR XR) 75 MG extended release capsule Take 75 mg by mouth in the morning. timolol (TIMOPTIC) 0.5 % ophthalmic solution INSTILL 1 DROP INTO LEFT EYE ONCE DAILY      divalproex (DEPAKOTE ER) 250 MG extended release tablet TAKE 3 TABLETS BY MOUTH TWICE A DAY      mirtazapine (REMERON) 15 MG tablet Take 15 mg by mouth nightly      QUEtiapine (SEROQUEL) 100 MG tablet TAKE 1 TABLET BY MOUTH EVERY EVENING       No current facility-administered medications for this visit.        Rosalva Finley MD, FACE      Portions of this note were generated with the assistance of voice recognition software. As such, some errors in transcription may be present.

## 2022-11-29 ENCOUNTER — TELEPHONE (OUTPATIENT)
Dept: CARDIOLOGY CLINIC | Age: 67
End: 2022-11-29

## 2022-11-30 ENCOUNTER — TELEPHONE (OUTPATIENT)
Dept: CARDIOLOGY CLINIC | Age: 67
End: 2022-11-30

## 2022-11-30 DIAGNOSIS — I10 ESSENTIAL HYPERTENSION: Primary | ICD-10-CM

## 2022-11-30 NOTE — TELEPHONE ENCOUNTER
----- Message from Lloyd Hernández MD sent at 11/30/2022  8:39 AM EST -----  Regarding: cardiac mri  Please schedule a cardiac MRI: \"unexplained moderate LVH on echo. LBBB. R/O an infiltrative cardiomyopathy\". I spoke to his wife and she is expecting a call. Thanks.

## 2022-12-21 ENCOUNTER — TELEPHONE (OUTPATIENT)
Dept: INTERNAL MEDICINE CLINIC | Facility: CLINIC | Age: 67
End: 2022-12-21

## 2022-12-21 DIAGNOSIS — F41.9 ANXIETY: Primary | ICD-10-CM

## 2022-12-21 RX ORDER — ALPRAZOLAM 0.5 MG/1
TABLET ORAL
Qty: 2 TABLET | Refills: 0 | Status: SHIPPED | OUTPATIENT
Start: 2022-12-21 | End: 2023-01-20

## 2022-12-21 NOTE — TELEPHONE ENCOUNTER
Left message that rx was sent and that he is to take 1 tab prior to imaging and can repeat in 30 mins if needed.

## 2022-12-21 NOTE — TELEPHONE ENCOUNTER
Pt has 2 hour MRI scheduled nd wife is wanting to know if there is anything pt could take to keep calm during procedure

## 2022-12-29 ENCOUNTER — TELEPHONE (OUTPATIENT)
Dept: CARDIOLOGY CLINIC | Age: 67
End: 2022-12-29

## 2022-12-29 ENCOUNTER — HOSPITAL ENCOUNTER (OUTPATIENT)
Dept: MRI IMAGING | Age: 67
Discharge: HOME | End: 2022-12-29
Payer: COMMERCIAL

## 2022-12-29 DIAGNOSIS — I10 ESSENTIAL HYPERTENSION: ICD-10-CM

## 2022-12-29 PROCEDURE — 6360000004 HC RX CONTRAST MEDICATION: Performed by: INTERNAL MEDICINE

## 2022-12-29 PROCEDURE — A9579 GAD-BASE MR CONTRAST NOS,1ML: HCPCS | Performed by: INTERNAL MEDICINE

## 2022-12-29 PROCEDURE — 75561 CARDIAC MRI FOR MORPH W/DYE: CPT

## 2022-12-29 RX ADMIN — GADOTERIDOL 30 ML: 279.3 INJECTION, SOLUTION INTRAVENOUS at 11:05

## 2022-12-29 NOTE — TELEPHONE ENCOUNTER
Patient would like to know the results of his MRI, whenever possible. Please call when you get them.

## 2023-02-14 ENCOUNTER — OFFICE VISIT (OUTPATIENT)
Dept: INTERNAL MEDICINE CLINIC | Facility: CLINIC | Age: 68
End: 2023-02-14

## 2023-02-14 VITALS
SYSTOLIC BLOOD PRESSURE: 126 MMHG | WEIGHT: 148 LBS | DIASTOLIC BLOOD PRESSURE: 72 MMHG | BODY MASS INDEX: 23.23 KG/M2 | HEIGHT: 67 IN

## 2023-02-14 DIAGNOSIS — I35.1 NONRHEUMATIC AORTIC VALVE INSUFFICIENCY: ICD-10-CM

## 2023-02-14 DIAGNOSIS — F31.81 BIPOLAR 2 DISORDER (HCC): ICD-10-CM

## 2023-02-14 DIAGNOSIS — E23.0 HYPOPITUITARISM (HCC): Primary | ICD-10-CM

## 2023-02-14 DIAGNOSIS — I35.0 NONRHEUMATIC AORTIC VALVE STENOSIS: ICD-10-CM

## 2023-02-14 DIAGNOSIS — D35.2 BENIGN NEOPLASM OF PITUITARY GLAND (HCC): ICD-10-CM

## 2023-02-14 DIAGNOSIS — Z00.00 LABORATORY EXAMINATION ORDERED AS PART OF A ROUTINE GENERAL MEDICAL EXAMINATION: ICD-10-CM

## 2023-02-14 DIAGNOSIS — Z12.5 ENCOUNTER FOR SCREENING FOR MALIGNANT NEOPLASM OF PROSTATE: ICD-10-CM

## 2023-02-14 DIAGNOSIS — E03.8 CENTRAL HYPOTHYROIDISM: ICD-10-CM

## 2023-02-14 SDOH — ECONOMIC STABILITY: FOOD INSECURITY: WITHIN THE PAST 12 MONTHS, THE FOOD YOU BOUGHT JUST DIDN'T LAST AND YOU DIDN'T HAVE MONEY TO GET MORE.: NEVER TRUE

## 2023-02-14 SDOH — ECONOMIC STABILITY: HOUSING INSECURITY
IN THE LAST 12 MONTHS, WAS THERE A TIME WHEN YOU DID NOT HAVE A STEADY PLACE TO SLEEP OR SLEPT IN A SHELTER (INCLUDING NOW)?: NO

## 2023-02-14 SDOH — ECONOMIC STABILITY: INCOME INSECURITY: HOW HARD IS IT FOR YOU TO PAY FOR THE VERY BASICS LIKE FOOD, HOUSING, MEDICAL CARE, AND HEATING?: NOT HARD AT ALL

## 2023-02-14 SDOH — ECONOMIC STABILITY: FOOD INSECURITY: WITHIN THE PAST 12 MONTHS, YOU WORRIED THAT YOUR FOOD WOULD RUN OUT BEFORE YOU GOT MONEY TO BUY MORE.: NEVER TRUE

## 2023-02-14 ASSESSMENT — PATIENT HEALTH QUESTIONNAIRE - PHQ9
SUM OF ALL RESPONSES TO PHQ9 QUESTIONS 1 & 2: 2
SUM OF ALL RESPONSES TO PHQ QUESTIONS 1-9: 2
SUM OF ALL RESPONSES TO PHQ QUESTIONS 1-9: 2
1. LITTLE INTEREST OR PLEASURE IN DOING THINGS: 1
SUM OF ALL RESPONSES TO PHQ QUESTIONS 1-9: 2
SUM OF ALL RESPONSES TO PHQ QUESTIONS 1-9: 2
2. FEELING DOWN, DEPRESSED OR HOPELESS: 1

## 2023-02-14 NOTE — PROGRESS NOTES
SUBJECTIVE:   Tiffanie Rojas is a 74 y.o. male seen for a visit regarding   Chief Complaint   Patient presents with    Hypertension     6 month f/u. Hypertension  This is a chronic problem. The current episode started more than 1 year ago. The problem is unchanged. The problem is controlled (on no meds at present). Hyperlipidemia  This is a chronic problem. The current episode started more than 1 year ago. Condition status:   , HDL 59 done 8/2022-on lipitor. Past Medical History, Past Surgical History, Family history, Social History, and Medications were all reviewed with the patient today and updated as necessary. Current Outpatient Medications   Medication Sig Dispense Refill    chlorhexidine (PERIDEX) 0.12 % solution SWISH 15 ML FOR 30 SECONDS, THEN SPIT, TWICE A DAY      levothyroxine (SYNTHROID) 75 MCG tablet Take 1 tablet by mouth every morning (before breakfast) 90 tablet 3    atorvastatin (LIPITOR) 20 MG tablet Take 1 tablet by mouth daily 90 tablet 3    venlafaxine (EFFEXOR XR) 75 MG extended release capsule Take 75 mg by mouth in the morning. timolol (TIMOPTIC) 0.5 % ophthalmic solution INSTILL 1 DROP INTO LEFT EYE ONCE DAILY      divalproex (DEPAKOTE ER) 250 MG extended release tablet TAKE 3 TABLETS BY MOUTH TWICE A DAY      mirtazapine (REMERON) 15 MG tablet Take 15 mg by mouth nightly      QUEtiapine (SEROQUEL) 100 MG tablet TAKE 1 TABLET BY MOUTH EVERY EVENING       No current facility-administered medications for this visit.      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 Cardiovascular:         Exercise is 3 mi/d walk-echo 11/2022-mild AS/AI; EF normal-slight increased wall thickness   Endocrine:        Sees Dr Yuval Loyd for panhypopit--thyroid level ok Nov   Psychiatric/Behavioral:          Sees Kristen Ivory monthly ; Juliocesar Robles q3mo       OBJECTIVE:  /[birth year]   Ht 5' 7\" (1.702 m)   Wt 148 lb (67.1 kg)   BMI 23.18 kg/m²      Physical Exam  Cardiovascular:      Rate and Rhythm: Normal rate and regular rhythm. Heart sounds: Murmur (gr 1/6 diastolic murmur aortic) heard. Medical problems and test results were reviewed with the patient today. ASSESSMENT and PLAN     1. Hypopituitarism ([address]  2. Bipolar 2 disorder ([address]  3. Benign neoplasm of pituitary gland ([address]  4. Central hypothyroidism  5. Nonrheumatic aortic valve stenosis  6. Nonrheumatic aortic valve insufficiency   Aortic valve is mild disease; bipolar followed by Dr Madilyn Brittle; Dr Yuval Loyd see the pituitary and endo issues-see in 6 mo  reviewed medications and side effects in detail     No follow-ups on file.

## 2023-05-11 DIAGNOSIS — D35.2 BENIGN NEOPLASM OF PITUITARY GLAND (HCC): ICD-10-CM

## 2023-05-12 LAB
ALBUMIN SERPL-MCNC: 3.7 G/DL (ref 3.2–4.6)
ALBUMIN/GLOB SERPL: 0.9 (ref 0.4–1.6)
ALP SERPL-CCNC: 53 U/L (ref 50–136)
ALT SERPL-CCNC: 25 U/L (ref 12–65)
ANION GAP SERPL CALC-SCNC: 4 MMOL/L (ref 2–11)
AST SERPL-CCNC: 17 U/L (ref 15–37)
BILIRUB SERPL-MCNC: 0.7 MG/DL (ref 0.2–1.1)
BUN SERPL-MCNC: 23 MG/DL (ref 8–23)
CALCIUM SERPL-MCNC: 9.5 MG/DL (ref 8.3–10.4)
CHLORIDE SERPL-SCNC: 105 MMOL/L (ref 101–110)
CO2 SERPL-SCNC: 27 MMOL/L (ref 21–32)
CREAT SERPL-MCNC: 0.8 MG/DL (ref 0.8–1.5)
GLOBULIN SER CALC-MCNC: 4 G/DL (ref 2.8–4.5)
GLUCOSE SERPL-MCNC: 70 MG/DL (ref 65–100)
POTASSIUM SERPL-SCNC: 4.5 MMOL/L (ref 3.5–5.1)
PROT SERPL-MCNC: 7.7 G/DL (ref 6.3–8.2)
SODIUM SERPL-SCNC: 136 MMOL/L (ref 133–143)
T4 FREE SERPL-MCNC: 1 NG/DL (ref 0.78–1.46)

## 2023-05-16 LAB — IGF-I SERPL-MCNC: 42 NG/ML (ref 59–230)

## 2023-05-23 ENCOUNTER — OFFICE VISIT (OUTPATIENT)
Dept: ENDOCRINOLOGY | Age: 68
End: 2023-05-23
Payer: MEDICARE

## 2023-05-23 VITALS
SYSTOLIC BLOOD PRESSURE: 110 MMHG | HEART RATE: 70 BPM | WEIGHT: 149 LBS | BODY MASS INDEX: 23.34 KG/M2 | DIASTOLIC BLOOD PRESSURE: 78 MMHG | OXYGEN SATURATION: 98 %

## 2023-05-23 DIAGNOSIS — E23.0 HYPOPITUITARISM (HCC): ICD-10-CM

## 2023-05-23 DIAGNOSIS — E03.8 CENTRAL HYPOTHYROIDISM: ICD-10-CM

## 2023-05-23 DIAGNOSIS — E23.0 GROWTH HORMONE DEFICIENCY (HCC): ICD-10-CM

## 2023-05-23 DIAGNOSIS — D35.2 BENIGN NEOPLASM OF PITUITARY GLAND (HCC): Primary | ICD-10-CM

## 2023-05-23 DIAGNOSIS — M85.89 OSTEOPENIA OF MULTIPLE SITES: ICD-10-CM

## 2023-05-23 PROBLEM — I50.22 CHRONIC SYSTOLIC (CONGESTIVE) HEART FAILURE (HCC): Status: ACTIVE | Noted: 2023-05-23

## 2023-05-23 PROCEDURE — G8420 CALC BMI NORM PARAMETERS: HCPCS | Performed by: INTERNAL MEDICINE

## 2023-05-23 PROCEDURE — 3078F DIAST BP <80 MM HG: CPT | Performed by: INTERNAL MEDICINE

## 2023-05-23 PROCEDURE — 3017F COLORECTAL CA SCREEN DOC REV: CPT | Performed by: INTERNAL MEDICINE

## 2023-05-23 PROCEDURE — 1036F TOBACCO NON-USER: CPT | Performed by: INTERNAL MEDICINE

## 2023-05-23 PROCEDURE — 99214 OFFICE O/P EST MOD 30 MIN: CPT | Performed by: INTERNAL MEDICINE

## 2023-05-23 PROCEDURE — 3074F SYST BP LT 130 MM HG: CPT | Performed by: INTERNAL MEDICINE

## 2023-05-23 PROCEDURE — G8428 CUR MEDS NOT DOCUMENT: HCPCS | Performed by: INTERNAL MEDICINE

## 2023-05-23 PROCEDURE — 1123F ACP DISCUSS/DSCN MKR DOCD: CPT | Performed by: INTERNAL MEDICINE

## 2023-05-23 RX ORDER — LEVOTHYROXINE SODIUM 0.07 MG/1
75 TABLET ORAL
Qty: 90 TABLET | Refills: 3
Start: 2023-05-23

## 2023-05-23 NOTE — PROGRESS NOTES
This was not further evaluated at that time. Symptoms: See review of systems below     Fertility: He has 3 children with no history of infertility     Imagin2020: [facility] 5.1 x 5.6 mm well-defined nodule within the mid posterior aspect of the sella turcica, likely a pituitary adenoma. 2020: MRI pituitary ([address] [place] [address])- normal pituitary. 2021: DXA ([address] [place] [address])- L1/3/4 BMD 1.054/T-score -1.5, left hip BMD 0.806/T-score -2.0, right distal 33% radius BMD 0.906/T-score -0.8. Laboratory evaluation:  2016: TSH 3.410.  2017: TSH 0.684, free T4 1.05.  2017: TSH 2.120, free T4 0.88.  2018: TSH 0.359, free T4 1.10.  2019: TSH 0.774.  2020: TSH 0.014.  7/10/2020: TSH 0.037, free T4 0.80.  2020 at 10:21 AM: Free T4 0.6, cortisol 6.2, prolactin 5.6 (off of levothyroxine for a few days prior to this test). 2020 at 11:02 AM: TSH 2.510, free T4 0.59, testosterone 775, LH 2.9, cortisol 9.9, ACTH 13.1, IGF-I 49 (off of thyroid hormone since mid2020). 10/28/2020 at 4:31 PM: TSH 0.041, free T4 1.11, free T4 by dialysis 1.4, T4 5.5, T3 92, antithyroid peroxidase antibodies 50 (+), IGF-I 42, cortisol 5.0.  12/3/2020 at 8:05 AM: TSH 3.020, free T4 0.57, free T4 by dialysis 0.65, IGF-I 56, cortisol 10.8.  12/15/2020: Cosyntropin stimulation test- cortisol 22.0 (30 minutes) --> 26.0 (60 minutes). 3/9/2021: Free T4 1.17, IGF-I 37.  2021: Free T4 1.18, IGF-I 63.  2022: Free T4 1. 17.  8/3/2022: Free T4 0.9, IGF-I 55.  11/15/2022: Free T4 1.0, IGF-I 42.  2023: Free T4 1.0, IGF-I 42. Prior/current treatment:   Adrenal: none  Growth hormone: none  Testosterone: none  Thyroid: Levothyroxine was started many years ago. His dose has been adjusted many times. He stopped levothyroxine mid-2020. He started levothyroxine 75 mcg daily 10/6/2020. Levothyroxine was discontinued 2020.   Levothyroxine 75 mcg daily was resumed

## 2023-07-21 RX ORDER — ATORVASTATIN CALCIUM 20 MG/1
TABLET, FILM COATED ORAL
Qty: 90 TABLET | Refills: 3 | Status: SHIPPED | OUTPATIENT
Start: 2023-07-21

## 2023-07-21 NOTE — TELEPHONE ENCOUNTER
Out of Medication! Refill:    Atorvastatin 20 mg          Send:    [facility]        Thanks!

## 2023-08-10 ENCOUNTER — TELEPHONE (OUTPATIENT)
Dept: INTERNAL MEDICINE CLINIC | Facility: CLINIC | Age: 68
End: 2023-08-10

## 2023-08-10 NOTE — TELEPHONE ENCOUNTER
----- Message from Brit Bernard sent at 8/9/2023  4:00 PM EDT -----  Subject: Referral Request    Reason for referral request? Podiatrist for infected big toe on left foot  Provider patient wants to be referred to(if known):     Provider Phone Number(if known): Additional Information for Provider?  This has been going on for 4 weeks   from cutting a nail too short.  ---------------------------------------------------------------------------  --------------  Greg DRUMMOND    [phone]; OK to leave message on voicemail  ---------------------------------------------------------------------------  --------------

## 2023-08-10 NOTE — TELEPHONE ENCOUNTER
Uriah Larios I wanted to give you a heads up . I called this pt and he informed me he only wants a referral to a podiatrist due to a right big toe infection he's had the past 3-4 weeks . Pt had gone to Urgent care and was given cream and per pt the infection has improved and he's only asking for a referral. I offered him VV with [facility] for today but he declined this and all other emil I offered for tomorrow. (Sital was willing to do VV at 5 pm today) Pt insisted on being seen BUT only on Monday and after 3 pm so I put him on your ludy at 3:15 for 15 mins only.  Pt said he just wants someone to look at it in person and area has improved but he insisted on getting a referral I informed him this visit will only be about eval and a poss referral

## 2023-08-10 NOTE — TELEPHONE ENCOUNTER
----- Message from Marcelino Delroy sent at 8/9/2023  4:00 PM EDT -----  Subject: Referral Request    Reason for referral request? Podiatrist for infected big toe on left foot  Provider patient wants to be referred to(if known):     Provider Phone Number(if known): Additional Information for Provider?  This has been going on for 4 weeks   from cutting a nail too short.  ---------------------------------------------------------------------------  --------------  Milly Marker INFO    [phone]; OK to leave message on voicemail  ---------------------------------------------------------------------------  --------------

## 2023-08-10 NOTE — TELEPHONE ENCOUNTER
I spoke to pt and I offered him  a VV at 5 pm today with Sital but pt declined and also declined apps I offered him to tomorrow. Pt said he went to an urgent care and was given a cream which has helped but he wanted to see seen in person and wants a referral to have his right big toe checked and he wants to be seen on Monday after 3 pm I sent a message to Lionel Barraza letting her know I did work him in with her at 3:15

## 2023-08-14 ENCOUNTER — OFFICE VISIT (OUTPATIENT)
Dept: INTERNAL MEDICINE CLINIC | Facility: CLINIC | Age: 68
End: 2023-08-14
Payer: MEDICARE

## 2023-08-14 VITALS
SYSTOLIC BLOOD PRESSURE: 114 MMHG | HEART RATE: 73 BPM | WEIGHT: 146.6 LBS | DIASTOLIC BLOOD PRESSURE: 68 MMHG | HEIGHT: 67 IN | BODY MASS INDEX: 23.01 KG/M2 | OXYGEN SATURATION: 97 %

## 2023-08-14 DIAGNOSIS — L03.031 PARONYCHIA OF GREAT TOE OF RIGHT FOOT: ICD-10-CM

## 2023-08-14 DIAGNOSIS — L60.0 INGROWING RIGHT GREAT TOENAIL: Primary | ICD-10-CM

## 2023-08-14 PROCEDURE — G8427 DOCREV CUR MEDS BY ELIG CLIN: HCPCS | Performed by: NURSE PRACTITIONER

## 2023-08-14 PROCEDURE — 3078F DIAST BP <80 MM HG: CPT | Performed by: NURSE PRACTITIONER

## 2023-08-14 PROCEDURE — G8420 CALC BMI NORM PARAMETERS: HCPCS | Performed by: NURSE PRACTITIONER

## 2023-08-14 PROCEDURE — 3074F SYST BP LT 130 MM HG: CPT | Performed by: NURSE PRACTITIONER

## 2023-08-14 PROCEDURE — 99213 OFFICE O/P EST LOW 20 MIN: CPT | Performed by: NURSE PRACTITIONER

## 2023-08-14 PROCEDURE — 1123F ACP DISCUSS/DSCN MKR DOCD: CPT | Performed by: NURSE PRACTITIONER

## 2023-08-14 PROCEDURE — 1036F TOBACCO NON-USER: CPT | Performed by: NURSE PRACTITIONER

## 2023-08-14 PROCEDURE — 3017F COLORECTAL CA SCREEN DOC REV: CPT | Performed by: NURSE PRACTITIONER

## 2023-11-22 DIAGNOSIS — E23.0 GROWTH HORMONE DEFICIENCY (HCC): ICD-10-CM

## 2023-11-22 DIAGNOSIS — E03.8 CENTRAL HYPOTHYROIDISM: ICD-10-CM

## 2023-11-22 LAB — T4 FREE SERPL-MCNC: 1.1 NG/DL (ref 0.78–1.46)

## 2023-11-25 LAB — IGF-I SERPL-MCNC: 58 NG/ML (ref 59–230)

## 2023-11-27 ENCOUNTER — OFFICE VISIT (OUTPATIENT)
Dept: ENDOCRINOLOGY | Age: 68
End: 2023-11-27
Payer: COMMERCIAL

## 2023-11-27 VITALS
WEIGHT: 149 LBS | BODY MASS INDEX: 23.39 KG/M2 | HEIGHT: 67 IN | DIASTOLIC BLOOD PRESSURE: 73 MMHG | SYSTOLIC BLOOD PRESSURE: 125 MMHG | HEART RATE: 71 BPM

## 2023-11-27 DIAGNOSIS — E23.0 GROWTH HORMONE DEFICIENCY (HCC): ICD-10-CM

## 2023-11-27 DIAGNOSIS — M85.89 OSTEOPENIA OF MULTIPLE SITES: ICD-10-CM

## 2023-11-27 DIAGNOSIS — D35.2 BENIGN NEOPLASM OF PITUITARY GLAND (HCC): Primary | ICD-10-CM

## 2023-11-27 DIAGNOSIS — E23.0 HYPOPITUITARISM (HCC): ICD-10-CM

## 2023-11-27 DIAGNOSIS — E03.8 CENTRAL HYPOTHYROIDISM: ICD-10-CM

## 2023-11-27 PROCEDURE — 3017F COLORECTAL CA SCREEN DOC REV: CPT | Performed by: INTERNAL MEDICINE

## 2023-11-27 PROCEDURE — 3074F SYST BP LT 130 MM HG: CPT | Performed by: INTERNAL MEDICINE

## 2023-11-27 PROCEDURE — 3078F DIAST BP <80 MM HG: CPT | Performed by: INTERNAL MEDICINE

## 2023-11-27 PROCEDURE — 99214 OFFICE O/P EST MOD 30 MIN: CPT | Performed by: INTERNAL MEDICINE

## 2023-11-27 PROCEDURE — G8420 CALC BMI NORM PARAMETERS: HCPCS | Performed by: INTERNAL MEDICINE

## 2023-11-27 PROCEDURE — G8484 FLU IMMUNIZE NO ADMIN: HCPCS | Performed by: INTERNAL MEDICINE

## 2023-11-27 PROCEDURE — G8427 DOCREV CUR MEDS BY ELIG CLIN: HCPCS | Performed by: INTERNAL MEDICINE

## 2023-11-27 PROCEDURE — 1123F ACP DISCUSS/DSCN MKR DOCD: CPT | Performed by: INTERNAL MEDICINE

## 2023-11-27 PROCEDURE — 1036F TOBACCO NON-USER: CPT | Performed by: INTERNAL MEDICINE

## 2023-11-27 RX ORDER — LEVOTHYROXINE SODIUM 0.07 MG/1
75 TABLET ORAL
Qty: 90 TABLET | Refills: 3 | Status: SHIPPED | OUTPATIENT
Start: 2023-11-27

## 2023-11-27 NOTE — PROGRESS NOTES
Jose E Peters MD, [facility]            Reason for visit: Follow-up of hypopituitarism      ASSESSMENT AND PLAN:    1. Benign neoplasm of pituitary gland [facility])  He has clear evidence of central hypothyroidism. Free T4 is in the normal range. MRI in January 2020 demonstrated a small pituitary adenoma. Repeat MRI in December 2020 was normal.  Cosyntropin stimulation test in December 2020 was normal, excluding adrenal insufficiency. He also has a low IGF-I level and probably has growth hormone deficiency. He has no attributable symptoms, and this requires no treatment. Follow up in 6 months with repeat labs. - T4, Free; Future  - Comprehensive Metabolic Panel; Future    2. Hypopituitarism ([address])    3. Central hypothyroidism  He is biochemically euthyroid. In patients with central hypothyroidism, only free T4 should be checked. TSH should not be checked, as it is unreliable in this setting and confuses the clinical picture. - levothyroxine (SYNTHROID) 75 MCG tablet; Take 1 tablet by mouth every morning (before breakfast)  Dispense: 90 tablet; Refill: 3    4. Growth hormone deficiency ([address])  As above. 5. Osteopenia of multiple sites  I recommend that this be monitored closely with serial bone densitometry, as he does have untreated growth hormone deficiency which is a risk factor for loss of bone density. Follow-up and Dispositions    Return in about 6 months (around 5/27/2024). History of Present Illness:    PITUITARY DISEASE  Digna Jaimes is here for follow-up of probable hypopituitarism. He was diagnosed with hypothyroidism in the late 2000s or early 2010s. He took lithium (for treatment of bipolar disorder) for many years and stopped it in late 2017. He was a patient at psychiatric facility in late 2019 or early 2020. Brain MRI at that time demonstrated a pituitary tumor (see below).   This was not further evaluated at that

## 2024-05-28 DIAGNOSIS — E03.8 CENTRAL HYPOTHYROIDISM: ICD-10-CM

## 2024-05-28 DIAGNOSIS — D35.2 BENIGN NEOPLASM OF PITUITARY GLAND (HCC): ICD-10-CM

## 2024-05-28 LAB
ALBUMIN SERPL-MCNC: 3.5 G/DL (ref 3.2–4.6)
ALBUMIN/GLOB SERPL: 1 (ref 1–1.9)
ALP SERPL-CCNC: 50 U/L (ref 40–129)
ALT SERPL-CCNC: 18 U/L (ref 12–65)
ANION GAP SERPL CALC-SCNC: 11 MMOL/L (ref 9–18)
AST SERPL-CCNC: 23 U/L (ref 15–37)
BILIRUB SERPL-MCNC: 0.4 MG/DL (ref 0–1.2)
BUN SERPL-MCNC: 29 MG/DL (ref 8–23)
CALCIUM SERPL-MCNC: 9.4 MG/DL (ref 8.8–10.2)
CHLORIDE SERPL-SCNC: 105 MMOL/L (ref 98–107)
CO2 SERPL-SCNC: 26 MMOL/L (ref 20–28)
CREAT SERPL-MCNC: 0.62 MG/DL (ref 0.8–1.3)
GLOBULIN SER CALC-MCNC: 3.5 G/DL (ref 2.3–3.5)
GLUCOSE SERPL-MCNC: 83 MG/DL (ref 70–99)
POTASSIUM SERPL-SCNC: 4.5 MMOL/L (ref 3.5–5.1)
PROT SERPL-MCNC: 7 G/DL (ref 6.3–8.2)
SODIUM SERPL-SCNC: 142 MMOL/L (ref 136–145)
T4 FREE SERPL-MCNC: 1.2 NG/DL (ref 0.9–1.7)

## 2024-05-30 ENCOUNTER — OFFICE VISIT (OUTPATIENT)
Dept: ENDOCRINOLOGY | Age: 69
End: 2024-05-30

## 2024-05-30 VITALS
BODY MASS INDEX: 23.49 KG/M2 | HEART RATE: 70 BPM | SYSTOLIC BLOOD PRESSURE: 124 MMHG | DIASTOLIC BLOOD PRESSURE: 78 MMHG | WEIGHT: 150 LBS

## 2024-05-30 DIAGNOSIS — E23.0 HYPOPITUITARISM (HCC): ICD-10-CM

## 2024-05-30 DIAGNOSIS — E03.8 CENTRAL HYPOTHYROIDISM: ICD-10-CM

## 2024-05-30 DIAGNOSIS — M85.89 OSTEOPENIA OF MULTIPLE SITES: ICD-10-CM

## 2024-05-30 DIAGNOSIS — E23.0 GROWTH HORMONE DEFICIENCY (HCC): ICD-10-CM

## 2024-05-30 DIAGNOSIS — D35.2 BENIGN NEOPLASM OF PITUITARY GLAND (HCC): Primary | ICD-10-CM

## 2024-05-30 RX ORDER — LEVOTHYROXINE SODIUM 0.07 MG/1
75 TABLET ORAL
Qty: 90 TABLET | Refills: 3
Start: 2024-05-30

## 2024-05-30 ASSESSMENT — ENCOUNTER SYMPTOMS
CONSTIPATION: 0
DIARRHEA: 0

## 2024-05-30 NOTE — PROGRESS NOTES
GIANCARLO Sánchez MD, [facility] ENDOCRINOLOGY   AND   THYROID NODULE CLINIC            Reason for visit: Follow-up of hypopituitarism      ASSESSMENT AND PLAN:    1. Benign neoplasm of pituitary gland (HCC)  He has clear evidence of central hypothyroidism.  Free T4 is in the normal range.  MRI in January 2020 demonstrated a small pituitary adenoma.  Repeat MRI in December 2020 was normal.  Cosyntropin stimulation test in December 2020 was normal, excluding adrenal insufficiency.  He also has a low IGF-I level and probably has growth hormone deficiency.  He has no attributable symptoms, and this requires no treatment.  Follow up in 6 months with repeat labs.  - T4, Free; Future  - Comprehensive Metabolic Panel; Future    2. Hypopituitarism (HCC)    3. Central hypothyroidism  He is biochemically euthyroid.  In patients with central hypothyroidism, only free T4 should be checked.  TSH should not be checked, as it is unreliable in this setting and confuses the clinical picture.  - levothyroxine (SYNTHROID) 75 MCG tablet; Take 1 tablet by mouth every morning (before breakfast)  Dispense: 90 tablet; Refill: 3    4. Growth hormone deficiency (HCC)  As above.    5. Osteopenia of multiple sites  I recommend that this be monitored closely with serial bone densitometry, as he does have untreated growth hormone deficiency which is a risk factor for loss of bone density.      Follow-up and Dispositions    Return in about 6 months (around 11/30/2024).             History of Present Illness:    PITUITARY DISEASE  Errol Malik is here for follow-up of probable hypopituitarism.  He was diagnosed with hypothyroidism in the late 2000s or early 2010s.  He took lithium (for treatment of bipolar disorder) for many years and stopped it in late 2017.  He was a patient at psychiatric facility in late 2019 or early 2020.  Brain MRI at that time demonstrated a pituitary tumor (see below).  This was not further evaluated at

## 2024-10-21 ENCOUNTER — HOSPITAL ENCOUNTER (OUTPATIENT)
Dept: PHYSICAL THERAPY | Age: 69
Setting detail: RECURRING SERIES
Discharge: HOME | End: 2024-10-24
Payer: MEDICARE

## 2024-10-21 DIAGNOSIS — M62.81 LEFT-SIDED MUSCLE WEAKNESS: ICD-10-CM

## 2024-10-21 DIAGNOSIS — M25.572 PAIN IN LEFT ANKLE AND JOINTS OF LEFT FOOT: Primary | ICD-10-CM

## 2024-10-21 DIAGNOSIS — R26.2 DIFFICULTY IN WALKING, NOT ELSEWHERE CLASSIFIED: ICD-10-CM

## 2024-10-21 PROCEDURE — 97162 PT EVAL MOD COMPLEX 30 MIN: CPT
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
## 2024-10-22 ASSESSMENT — PAIN SCALES - GENERAL: PAINLEVEL_OUTOF10: 0

## 2024-10-24 ENCOUNTER — HOSPITAL ENCOUNTER (OUTPATIENT)
Dept: PHYSICAL THERAPY | Age: 69
Setting detail: RECURRING SERIES
Discharge: HOME | End: 2024-10-27
Payer: MEDICARE

## 2024-10-24 PROCEDURE — 97110 THERAPEUTIC EXERCISES: CPT

## 2024-10-24 PROCEDURE — 97140 MANUAL THERAPY 1/> REGIONS: CPT

## 2024-10-24 NOTE — PROGRESS NOTES
Last Reviewed:  10/24/2024  Updated Objective Findings:  None Today  Treatment   THERAPEUTIC EXERCISE: (25 minutes):    Exercises per grid below to improve mobility, strength, balance, and coordination.  Required moderate visual, verbal, and tactile cues to promote proper body alignment, promote proper body posture, and promote proper body mechanics.  Progressed resistance, range, repetitions, and complexity of movement as indicated.     Date:  10/24/24 Date:   Date:     Activity/Exercise Parameters Parameters Parameters   Recumbent bike Level 1, 6 minutes for ankle mobility     PF/DF Reviewed for HEP     Standing heel/toe rocks X20 for mobility and weight shifting     Standing gastroc stretch at rail Hold 30 x 3                             MANUAL THERAPY: (25 minutes):   Joint mobilization and Soft tissue mobilization was utilized and necessary because of the patient's restricted joint motion, loss of articular motion, and restricted motion of soft tissue.   Scar mobilization to surgical incision  PROM/passive stretching in all planes to L ankle  STM to plantar foot, gastroc/heelcord region to improve soft tissue mobility      Treatment/Session Summary:    Treatment Assessment: Patient reported no increase in ankle/foot pain after session, needs frequent cueing for standing exercises and safety.    Communication/Consultation:  None today  Equipment provided today:  None  Access Code [email or web address]   Recommendations/Intent for next treatment session: Next visit will focus on Transfers, LE strength and ankle strength and stability.    >Total Treatment Billable Duration:  45 minutes   Time In: 0915  Time Out: 1000    Dawn Alvarado PTA         Charge Capture  Events  360T Portal  Appt Desk  Attendance Report     Future Appointments   Date Time Provider Department Center   10/29/2024 10:00 AM Dawn Alvarado PTA [facility]   10/31/2024 12:15 PM Dawn Alvarado PTA [facility]   11/4/2024 10:00 AM Dawn Alvarado PTA [facility]

## 2024-10-29 ENCOUNTER — HOSPITAL ENCOUNTER (OUTPATIENT)
Dept: PHYSICAL THERAPY | Age: 69
Setting detail: RECURRING SERIES
Discharge: HOME | End: 2024-11-01
Payer: MEDICARE

## 2024-10-29 PROCEDURE — 97110 THERAPEUTIC EXERCISES: CPT

## 2024-10-29 NOTE — PROGRESS NOTES
PT [facility]   11/19/2024  9:00 AM Osman Askew, PT [facility]   11/21/2024  2:00 PM Osman Askew, PT [facility]   11/25/2024  9:15 AM Dawn Alvarado, PTA [facility]   12/2/2024 10:45 AM Dawn Alvarado, PTA [facility]   12/4/2024 11:30 AM Andrew Sánchez MD [facility]   12/6/2024  9:00 AM Osman Askew, PT [facility]   12/9/2024 10:00 AM Osman Askew, PT [facility]   12/11/2024  9:00 AM Osman Askew, PT [facility]

## 2024-10-31 ENCOUNTER — HOSPITAL ENCOUNTER (OUTPATIENT)
Dept: PHYSICAL THERAPY | Age: 69
Setting detail: RECURRING SERIES
Discharge: HOME | End: 2024-11-03
Payer: MEDICARE

## 2024-10-31 PROCEDURE — 97110 THERAPEUTIC EXERCISES: CPT

## 2024-10-31 NOTE — PROGRESS NOTES
Errol KAY Malik  : 1955  Primary: Medicare Part A And B (Medicare)  Secondary: BCBS [facility]  [place] [address]  Phone: [phone]  Fax: [phone] Plan Frequency: 2 times a week for 8 weeks then 1 time a week for 4 weeks  Plan of Care/Certification Expiration Date: 25        Plan of Care/Certification Expiration Date:  Plan of Care/Certification Expiration Date: 25    Frequency/Duration: Plan Frequency: 2 times a week for 8 weeks then 1 time a week for 4 weeks      Time In/Out:   Time In: 1215  Time Out: 1300      PT Visit Info:    Total # of Visits to Date: 3      Visit Count:  4    OUTPATIENT PHYSICAL THERAPY:   Treatment Note 10/31/2024       Episode  (Left ankle fracture)               Treatment Diagnosis:    Pain in left ankle and joints of left foot  Difficulty in walking, not elsewhere classified  Left-sided muscle weakness  Medical/Referring Diagnosis:    Displaced trimalleolar fracture of left lower leg, subsequent encounter for closed fracture with ro*    Referring Physician:  Shady Hernandez Jr., MD MD Orders:  PT Eval and Treat   Return MD Appt:     Date of Onset:  Onset Date: 24     Allergies:   Patient has no known allergies.  Restrictions/Precautions:   None      Interventions Planned (Treatment may consist of any combination of the following):  Current Treatment Recommendations: ROM; Strengthening; Balance training; Functional mobility training; Transfer training; Gait training; Stair training; Manual; Neuromuscular re-education; Modalities; Home exercise program; Positioning; Aquatics; Dry needling; Therapeutic activities    See Assessment Note    Subjective Comments:   Patient reports his hammer toe is feeling better today and he has no L ankle pain.  His wife reports that they discovered since his last session through his eye doctor that his eyesight is very poor in one eye, but she does

## 2024-11-04 ENCOUNTER — HOSPITAL ENCOUNTER (OUTPATIENT)
Dept: PHYSICAL THERAPY | Age: 69
Setting detail: RECURRING SERIES
Discharge: HOME | End: 2024-11-07
Payer: MEDICARE

## 2024-11-04 PROCEDURE — 97140 MANUAL THERAPY 1/> REGIONS: CPT

## 2024-11-04 PROCEDURE — 97110 THERAPEUTIC EXERCISES: CPT

## 2024-11-04 NOTE — PROGRESS NOTES
Errol Malik  : 1955  Primary: Medicare Part A And B (Medicare)  Secondary: BCBS [facility] [place] [address]  [place] [address]  Phone: [phone]  Fax: [phone] Plan Frequency: 2 times a week for 8 weeks then 1 time a week for 4 weeks  Plan of Care/Certification Expiration Date: 25        Plan of Care/Certification Expiration Date:  Plan of Care/Certification Expiration Date: 25    Frequency/Duration: Plan Frequency: 2 times a week for 8 weeks then 1 time a week for 4 weeks      Time In/Out:   Time In: 1000  Time Out: 1045      PT Visit Info:    Total # of Visits to Date: 5      Visit Count:  5    OUTPATIENT PHYSICAL THERAPY:   Treatment Note 2024       Episode  (Left ankle fracture)               Treatment Diagnosis:    Pain in left ankle and joints of left foot  Difficulty in walking, not elsewhere classified  Left-sided muscle weakness  Medical/Referring Diagnosis:    Displaced trimalleolar fracture of left lower leg, subsequent encounter for closed fracture with ro*    Referring Physician:  Shady Hernandez Jr., MD MD Orders:  PT Eval and Treat   Return MD Appt:     Date of Onset:  Onset Date: 24     Allergies:   Patient has no known allergies.  Restrictions/Precautions:   None      Interventions Planned (Treatment may consist of any combination of the following):  Current Treatment Recommendations: ROM; Strengthening; Balance training; Functional mobility training; Transfer training; Gait training; Stair training; Manual; Neuromuscular re-education; Modalities; Home exercise program; Positioning; Aquatics; Dry needling; Therapeutic activities    See Assessment Note    Subjective Comments:   Patient reports no pain in L ankle or in hammer toes today, and has more energy today.  No complaints overall.      Initial Pain Level::      0/10  Post Session Pain Level:        0/10  Medications Last Reviewed:

## 2024-11-07 ENCOUNTER — APPOINTMENT (OUTPATIENT)
Dept: PHYSICAL THERAPY | Age: 69
End: 2024-11-07
Payer: MEDICARE

## 2024-11-08 ENCOUNTER — HOSPITAL ENCOUNTER (OUTPATIENT)
Dept: PHYSICAL THERAPY | Age: 69
Setting detail: RECURRING SERIES
Discharge: HOME | End: 2024-11-11
Payer: MEDICARE

## 2024-11-08 PROCEDURE — 97110 THERAPEUTIC EXERCISES: CPT

## 2024-11-08 PROCEDURE — 97140 MANUAL THERAPY 1/> REGIONS: CPT

## 2024-11-08 NOTE — PROGRESS NOTES
Errol Malik  : 1955  Primary: Medicare Part A And B (Medicare)  Secondary: BCBS [facility]  [place] [address]  Phone: [phone]  Fax: [phone] Plan Frequency: 2 times a week for 8 weeks then 1 time a week for 4 weeks  Plan of Care/Certification Expiration Date: 25        Plan of Care/Certification Expiration Date:  Plan of Care/Certification Expiration Date: 25    Frequency/Duration: Plan Frequency: 2 times a week for 8 weeks then 1 time a week for 4 weeks      Time In/Out:   Time In: 1055  Time Out: 1145      PT Visit Info:    Total # of Visits to Date: 5      Visit Count:  6    OUTPATIENT PHYSICAL THERAPY:   Treatment Note 2024       Episode  (Left ankle fracture)               Treatment Diagnosis:    Pain in left ankle and joints of left foot  Difficulty in walking, not elsewhere classified  Left-sided muscle weakness  Medical/Referring Diagnosis:    Displaced trimalleolar fracture of left lower leg, subsequent encounter for closed fracture with ro*    Referring Physician:  Shady Hernandez Jr., MD MD Orders:  PT Eval and Treat   Return MD Appt:     Date of Onset:  Onset Date: 24     Allergies:   Patient has no known allergies.  Restrictions/Precautions:   None      Interventions Planned (Treatment may consist of any combination of the following):  Current Treatment Recommendations: ROM; Strengthening; Balance training; Functional mobility training; Transfer training; Gait training; Stair training; Manual; Neuromuscular re-education; Modalities; Home exercise program; Positioning; Aquatics; Dry needling; Therapeutic activities        Subjective Comments:   Patient reports feeling good and working with his  at home with sit to stands. Still uses his hands to help    Initial Pain Level::      0/10  Post Session Pain Level:        0/10  Medications Last Reviewed:  2024  Updated Objective

## 2024-11-13 ENCOUNTER — HOSPITAL ENCOUNTER (OUTPATIENT)
Dept: PHYSICAL THERAPY | Age: 69
Setting detail: RECURRING SERIES
Discharge: HOME | End: 2024-11-16
Payer: MEDICARE

## 2024-11-13 PROCEDURE — 97140 MANUAL THERAPY 1/> REGIONS: CPT

## 2024-11-13 PROCEDURE — 97110 THERAPEUTIC EXERCISES: CPT

## 2024-11-13 NOTE — PROGRESS NOTES
Errol Malik  : 1955  Primary: Medicare Part A And B (Medicare)  Secondary: BCBS [facility]  [place] [address]  Phone: [phone]  Fax: [phone] Plan Frequency: 2 times a week for 8 weeks then 1 time a week for 4 weeks  Plan of Care/Certification Expiration Date: 25        Plan of Care/Certification Expiration Date:  Plan of Care/Certification Expiration Date: 25    Frequency/Duration: Plan Frequency: 2 times a week for 8 weeks then 1 time a week for 4 weeks      Time In/Out:   Time In: 1000  Time Out: 1100      PT Visit Info:    Total # of Visits to Date: 5      Visit Count:  7    OUTPATIENT PHYSICAL THERAPY:   Treatment Note 2024       Episode  (Left ankle fracture)               Treatment Diagnosis:    Pain in left ankle and joints of left foot  Difficulty in walking, not elsewhere classified  Left-sided muscle weakness  Medical/Referring Diagnosis:    Displaced trimalleolar fracture of left lower leg, subsequent encounter for closed fracture with ro*    Referring Physician:  Shady Hernandez Jr., MD MD Orders:  PT Eval and Treat   Return MD Appt:     Date of Onset:  Onset Date: 24     Allergies:   Patient has no known allergies.  Restrictions/Precautions:   None      Interventions Planned (Treatment may consist of any combination of the following):  Current Treatment Recommendations: ROM; Strengthening; Balance training; Functional mobility training; Transfer training; Gait training; Stair training; Manual; Neuromuscular re-education; Modalities; Home exercise program; Positioning; Aquatics; Dry needling; Therapeutic activities        Subjective Comments:   Patient reports feeling good. He is feeling like he is doing a good bit of sit to stands    Initial Pain Level::      0/10  Post Session Pain Level:        0/10  Medications Last Reviewed:  2024  Updated Objective Findings:  None

## 2024-11-15 ENCOUNTER — APPOINTMENT (OUTPATIENT)
Dept: PHYSICAL THERAPY | Age: 69
End: 2024-11-15
Payer: MEDICARE

## 2024-11-15 ENCOUNTER — HOSPITAL ENCOUNTER (OUTPATIENT)
Dept: PHYSICAL THERAPY | Age: 69
Setting detail: RECURRING SERIES
Discharge: HOME | End: 2024-11-18
Payer: MEDICARE

## 2024-11-15 PROCEDURE — 97110 THERAPEUTIC EXERCISES: CPT

## 2024-11-15 PROCEDURE — 97140 MANUAL THERAPY 1/> REGIONS: CPT

## 2024-11-15 NOTE — PROGRESS NOTES
Errol Malik  : 1955  Primary: Medicare Part A And B (Medicare)  Secondary: BCBS [facility]  [place] [address]  Phone: [phone]  Fax: [phone] Plan Frequency: 2 times a week for 8 weeks then 1 time a week for 4 weeks  Plan of Care/Certification Expiration Date: 25        Plan of Care/Certification Expiration Date:  Plan of Care/Certification Expiration Date: 25    Frequency/Duration: Plan Frequency: 2 times a week for 8 weeks then 1 time a week for 4 weeks      Time In/Out:   Time In: 1359  Time Out: 1452      PT Visit Info:    Total # of Visits to Date: 5      Visit Count:  8    OUTPATIENT PHYSICAL THERAPY:   Treatment Note 11/15/2024       Episode  (Left ankle fracture)               Treatment Diagnosis:    Pain in left ankle and joints of left foot  Difficulty in walking, not elsewhere classified  Left-sided muscle weakness  Medical/Referring Diagnosis:    Displaced trimalleolar fracture of left lower leg, subsequent encounter for closed fracture with ro*    Referring Physician:  Shady Hernandez Jr., MD MD Orders:  PT Eval and Treat   Return MD Appt:     Date of Onset:  Onset Date: 24     Allergies:   Patient has no known allergies.  Restrictions/Precautions:   None      Interventions Planned (Treatment may consist of any combination of the following):  Current Treatment Recommendations: ROM; Strengthening; Balance training; Functional mobility training; Transfer training; Gait training; Stair training; Manual; Neuromuscular re-education; Modalities; Home exercise program; Positioning; Aquatics; Dry needling; Therapeutic activities        Subjective Comments:   Patient reports feeling good. He soaked his toe and that made it feel better and has a podiatrist appointment next monday    Initial Pain Level::      0/10  Post Session Pain Level:        0/10  Medications Last Reviewed:  11/15/2024  Updated

## 2024-11-18 ENCOUNTER — HOSPITAL ENCOUNTER (OUTPATIENT)
Dept: PHYSICAL THERAPY | Age: 69
Setting detail: RECURRING SERIES
Discharge: HOME | End: 2024-11-21
Payer: MEDICARE

## 2024-11-18 PROCEDURE — 97110 THERAPEUTIC EXERCISES: CPT

## 2024-11-18 PROCEDURE — 97140 MANUAL THERAPY 1/> REGIONS: CPT

## 2024-11-18 NOTE — PROGRESS NOTES
Errol KAY Helena  : 1955  Primary: Medicare Part A And B (Medicare)  [facility] [place] [address]  [place] [address]  Phone: [phone]  Fax: [phone] Plan Frequency: 2 times a week for 8 weeks then 1 time a week for 4 weeks  Plan of Care/Certification Expiration Date: 25        Plan of Care/Certification Expiration Date:  Plan of Care/Certification Expiration Date: 25    Frequency/Duration: Plan Frequency: 2 times a week for 8 weeks then 1 time a week for 4 weeks      Time In/Out:   Time In: 1345  Time Out: 1430      PT Visit Info:    Total # of Visits to Date: 9      Visit Count:  9    OUTPATIENT PHYSICAL THERAPY:   Treatment Note 2024       Episode  (Left ankle fracture)               Treatment Diagnosis:    Pain in left ankle and joints of left foot  Difficulty in walking, not elsewhere classified  Left-sided muscle weakness  Medical/Referring Diagnosis:    Displaced trimalleolar fracture of left lower leg, subsequent encounter for closed fracture with ro*    Referring Physician:  Shady Hernandez Jr., MD MD Orders:  PT Eval and Treat   Return MD Appt:     Date of Onset:  Onset Date: 24     Allergies:   Patient has no known allergies.  Restrictions/Precautions:   None      Interventions Planned (Treatment may consist of any combination of the following):  Current Treatment Recommendations: ROM; Strengthening; Balance training; Functional mobility training; Transfer training; Gait training; Stair training; Manual; Neuromuscular re-education; Modalities; Home exercise program; Positioning; Aquatics; Dry needling; Therapeutic activities        Subjective Comments:   Patient reports he had an ingrown toenail removed this morning and it involved getting a shot in the toe, but it is feeling OK right now.     Initial Pain Level::      0/10  Post Session Pain Level:        0/10  Medications Last Reviewed:

## 2024-11-20 ENCOUNTER — HOSPITAL ENCOUNTER (OUTPATIENT)
Dept: PHYSICAL THERAPY | Age: 69
Setting detail: RECURRING SERIES
Discharge: HOME | End: 2024-11-23
Payer: MEDICARE

## 2024-11-20 PROCEDURE — 97140 MANUAL THERAPY 1/> REGIONS: CPT

## 2024-11-20 PROCEDURE — 97110 THERAPEUTIC EXERCISES: CPT

## 2024-11-20 ASSESSMENT — PAIN SCALES - GENERAL: PAINLEVEL_OUTOF10: 0
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
## 2024-11-25 ENCOUNTER — HOSPITAL ENCOUNTER (OUTPATIENT)
Dept: PHYSICAL THERAPY | Age: 69
Setting detail: RECURRING SERIES
Discharge: HOME | End: 2024-11-28
Payer: MEDICARE

## 2024-11-25 PROCEDURE — 97110 THERAPEUTIC EXERCISES: CPT

## 2024-11-25 NOTE — PROGRESS NOTES
started standing up for singing in choir again, but notes he lost his balance yesterday while singing, but he caught himself with another .      Initial Pain Level::      0/10  Post Session Pain Level:        0/10  Medications Last Reviewed:  11/25/2024  Updated Objective Findings:  None Today  Treatment     THERAPEUTIC EXERCISE: (45 minutes):    Exercises per grid below to improve mobility, strength, balance, and coordination.  Required moderate visual, verbal, and tactile cues to promote proper body alignment, promote proper body posture, and promote proper body mechanics.  Progressed resistance, range, repetitions, and complexity of movement as indicated.     Date:  11/8/24 Date:  11/13/24 Date:  11/15/24 Date:  11/18/24 Date:  11/20/24 Date:  11/25/24   Activity/Exercise         Recumbent bike SCI FIT Bike L3 x 5 min SCI FIT Bike L3 x 5 min SCI FIT Bike L3 x 6 min SCI FIT Bike L3 x 6 min SCI FIT Bike L3 x 8 min SCI FIT Bike L3 x 8 min   PF/DF And inversion/eversion2# ankle weight x 20 each,  and circles CW/CCW And inversion/eversion2# ankle weight x 20 each,  and circles CW/CCW And inversion/eversion2# ankle weight x 20 each,  and circles CW/CCW And inversion/eversion2# ankle weight x 20 each,  and circles CW/CCW And inversion/eversion2# ankle weight x 20 each,  and circles CW/CCW    Standing heel/toe rocks Seated in chair focusing on slow control x 15, with toes on small ledge Seated in chair focusing on slow control x 15, with toes on small ledge Standing regular x 20  On foam x 10 Seated x 20 Seated x 20 Standing on foam x 15   Standing gastroc stretch at rail At incline 4c19keswrvx each At incline 0c26uwwhqil each At incline 0z02cntvava each At incline 6j49xnvcnmf each At incline 0v67lcjirep each At incline 1x15dyfnemh each   BAPS wobble board  X10 each direction (PF/DF, inv/ev, circles CW and CCW) X10 each direction (PF/DF, inv/ev, circles CW and CCW) X10 each direction (PF/DF, inv/ev, circles

## 2024-11-27 DIAGNOSIS — E03.8 CENTRAL HYPOTHYROIDISM: ICD-10-CM

## 2024-11-29 ENCOUNTER — TELEPHONE (OUTPATIENT)
Dept: ENDOCRINOLOGY | Age: 69
End: 2024-11-29

## 2024-11-29 DIAGNOSIS — E03.8 CENTRAL HYPOTHYROIDISM: ICD-10-CM

## 2024-11-29 RX ORDER — LEVOTHYROXINE SODIUM 75 UG/1
75 TABLET ORAL
Qty: 90 TABLET | Refills: 3 | OUTPATIENT
Start: 2024-11-29

## 2024-12-02 ENCOUNTER — HOSPITAL ENCOUNTER (OUTPATIENT)
Dept: PHYSICAL THERAPY | Age: 69
Setting detail: RECURRING SERIES
Discharge: HOME | End: 2024-12-05
Payer: MEDICARE

## 2024-12-02 DIAGNOSIS — E03.8 CENTRAL HYPOTHYROIDISM: ICD-10-CM

## 2024-12-02 DIAGNOSIS — E23.0 HYPOPITUITARISM (HCC): ICD-10-CM

## 2024-12-02 LAB
ALBUMIN SERPL-MCNC: 3.4 G/DL (ref 3.2–4.6)
ALBUMIN/GLOB SERPL: 0.9 (ref 1–1.9)
ALP SERPL-CCNC: 55 U/L (ref 40–129)
ALT SERPL-CCNC: 15 U/L (ref 8–55)
ANION GAP SERPL CALC-SCNC: 9 MMOL/L (ref 7–16)
AST SERPL-CCNC: 25 U/L (ref 15–37)
BILIRUB SERPL-MCNC: 0.4 MG/DL (ref 0–1.2)
BUN SERPL-MCNC: 26 MG/DL (ref 8–23)
CALCIUM SERPL-MCNC: 9.3 MG/DL (ref 8.8–10.2)
CHLORIDE SERPL-SCNC: 102 MMOL/L (ref 98–107)
CO2 SERPL-SCNC: 29 MMOL/L (ref 20–29)
CREAT SERPL-MCNC: 0.72 MG/DL (ref 0.8–1.3)
GLOBULIN SER CALC-MCNC: 3.7 G/DL (ref 2.3–3.5)
GLUCOSE SERPL-MCNC: 84 MG/DL (ref 70–99)
POTASSIUM SERPL-SCNC: 4.3 MMOL/L (ref 3.5–5.1)
PROT SERPL-MCNC: 7.1 G/DL (ref 6.3–8.2)
SODIUM SERPL-SCNC: 140 MMOL/L (ref 136–145)
T4 FREE SERPL-MCNC: 1.1 NG/DL (ref 0.9–1.7)

## 2024-12-02 PROCEDURE — 97110 THERAPEUTIC EXERCISES: CPT

## 2024-12-02 RX ORDER — LEVOTHYROXINE SODIUM 75 UG/1
75 TABLET ORAL
Qty: 90 TABLET | Refills: 3 | Status: SHIPPED | OUTPATIENT
Start: 2024-12-02

## 2024-12-02 RX ORDER — LEVOTHYROXINE SODIUM 75 UG/1
75 TABLET ORAL
Qty: 90 TABLET | Refills: 3 | OUTPATIENT
Start: 2024-12-02

## 2024-12-02 NOTE — PROGRESS NOTES
Errol KAY Malik  : 1955  Primary: Medicare Part A And B (Medicare)  Secondary: BCBS [facility] @ [address]  [place] [address]  Phone: [phone]  Fax: [phone] Plan Frequency: 2 times a week for 8 weeks then 1 time a week for 4 weeks  Plan of Care/Certification Expiration Date: 25        Plan of Care/Certification Expiration Date:  Plan of Care/Certification Expiration Date: 25    Frequency/Duration: Plan Frequency: 2 times a week for 8 weeks then 1 time a week for 4 weeks      Time In/Out:   Time In: 1045  Time Out: 1130      PT Visit Info:    Total # of Visits to Date: 12      Visit Count:  12    OUTPATIENT PHYSICAL THERAPY:   Treatment Note 2024       Episode  (Left ankle fracture)               Treatment Diagnosis:    Pain in left ankle and joints of left foot  Difficulty in walking, not elsewhere classified  Left-sided muscle weakness  Medical/Referring Diagnosis:    Displaced trimalleolar fracture of left lower leg, subsequent encounter for closed fracture with ro*    Referring Physician:  Shady Hernandez Jr., MD MD Orders:  PT Eval and Treat   Return MD Appt:     Date of Onset:  Onset Date: 24     Allergies:   Patient has no known allergies.  Restrictions/Precautions:   None      Interventions Planned (Treatment may consist of any combination of the following):  Current Treatment Recommendations: ROM; Strengthening; Balance training; Functional mobility training; Transfer training; Gait training; Stair training; Manual; Neuromuscular re-education; Modalities; Home exercise program; Positioning; Aquatics; Dry needling; Therapeutic activities        Subjective Comments:   Patient reports no issues today.  He did get out and exercise a bit over the holiday weekend, he reports he and his wife walked around the block twice.    Initial Pain Level::      0/10  Post Session Pain Level:        0/10  Medications

## 2024-12-03 ASSESSMENT — ENCOUNTER SYMPTOMS
DIARRHEA: 0
CONSTIPATION: 0

## 2024-12-03 NOTE — PROGRESS NOTES
[place] Sánchez MD, [facility] ENDOCRINOLOGY   AND   THYROID NODULE CLINIC            Reason for visit: Follow-up of hypopituitarism      ASSESSMENT AND PLAN:    1. Benign neoplasm of pituitary gland (HCC)  He has clear evidence of central hypothyroidism.  Free T4 is in the normal range on his current levothyroxine dose.  MRI in January 2020 demonstrated a small pituitary adenoma.  Repeat MRI in December 2020 was normal.  Cosyntropin stimulation test in December 2020 was normal, excluding adrenal insufficiency.  He also has a low IGF-I level and probably has growth hormone deficiency; he has no attributable symptoms, and this requires no treatment.  Follow up in 6 months with repeat labs.  - T4, Free; Future  - Comprehensive Metabolic Panel; Future    2. Hypopituitarism (HCC)    3. Central hypothyroidism  He is biochemically euthyroid.  In patients with central hypothyroidism, only free T4 should be checked.  TSH should not be checked, as it is unreliable in this setting and confuses the clinical picture.  - levothyroxine (SYNTHROID) 75 MCG tablet; Take 1 tablet by mouth every morning (before breakfast)  Dispense: 90 tablet; Refill: 3    4. Growth hormone deficiency (HCC)  As above.    5. Osteopenia of multiple sites  I recommend that this be monitored closely with serial bone densitometry, as he does have untreated growth hormone deficiency which is a risk factor for loss of bone density.      Follow-up and Dispositions    Return in about 6 months (around 6/4/2025).               History of Present Illness:    PITUITARY DISEASE  Errol Malik is here for follow-up of probable hypopituitarism.  He was diagnosed with hypothyroidism in the late 2000s or early 2010s.  He took lithium (for treatment of bipolar disorder) for many years and stopped it in late 2017.  He was a patient at psychiatric facility in late 2019 or early 2020.  Brain MRI at that time demonstrated a pituitary tumor (see below).  This

## 2024-12-04 ENCOUNTER — OFFICE VISIT (OUTPATIENT)
Dept: ENDOCRINOLOGY | Age: 69
End: 2024-12-04
Payer: MEDICARE

## 2024-12-04 VITALS — WEIGHT: 149 LBS | DIASTOLIC BLOOD PRESSURE: 76 MMHG | BODY MASS INDEX: 23.34 KG/M2 | SYSTOLIC BLOOD PRESSURE: 118 MMHG

## 2024-12-04 DIAGNOSIS — E23.0 GROWTH HORMONE DEFICIENCY (HCC): ICD-10-CM

## 2024-12-04 DIAGNOSIS — D35.2 BENIGN NEOPLASM OF PITUITARY GLAND (HCC): Primary | ICD-10-CM

## 2024-12-04 DIAGNOSIS — E03.8 CENTRAL HYPOTHYROIDISM: ICD-10-CM

## 2024-12-04 DIAGNOSIS — M85.89 OSTEOPENIA OF MULTIPLE SITES: ICD-10-CM

## 2024-12-04 DIAGNOSIS — E23.0 HYPOPITUITARISM (HCC): ICD-10-CM

## 2024-12-04 PROCEDURE — 99213 OFFICE O/P EST LOW 20 MIN: CPT | Performed by: INTERNAL MEDICINE

## 2024-12-04 PROCEDURE — G8427 DOCREV CUR MEDS BY ELIG CLIN: HCPCS | Performed by: INTERNAL MEDICINE

## 2024-12-04 PROCEDURE — 1159F MED LIST DOCD IN RCRD: CPT | Performed by: INTERNAL MEDICINE

## 2024-12-04 PROCEDURE — 3074F SYST BP LT 130 MM HG: CPT | Performed by: INTERNAL MEDICINE

## 2024-12-04 PROCEDURE — 3017F COLORECTAL CA SCREEN DOC REV: CPT | Performed by: INTERNAL MEDICINE

## 2024-12-04 PROCEDURE — 1123F ACP DISCUSS/DSCN MKR DOCD: CPT | Performed by: INTERNAL MEDICINE

## 2024-12-04 PROCEDURE — 1036F TOBACCO NON-USER: CPT | Performed by: INTERNAL MEDICINE

## 2024-12-04 PROCEDURE — G2211 COMPLEX E/M VISIT ADD ON: HCPCS | Performed by: INTERNAL MEDICINE

## 2024-12-04 PROCEDURE — G8420 CALC BMI NORM PARAMETERS: HCPCS | Performed by: INTERNAL MEDICINE

## 2024-12-04 PROCEDURE — G8484 FLU IMMUNIZE NO ADMIN: HCPCS | Performed by: INTERNAL MEDICINE

## 2024-12-04 PROCEDURE — 3078F DIAST BP <80 MM HG: CPT | Performed by: INTERNAL MEDICINE

## 2024-12-04 RX ORDER — APIXABAN 5 MG/1
TABLET, FILM COATED ORAL
COMMUNITY

## 2024-12-06 ENCOUNTER — HOSPITAL ENCOUNTER (OUTPATIENT)
Dept: PHYSICAL THERAPY | Age: 69
Setting detail: RECURRING SERIES
Discharge: HOME | End: 2024-12-09
Payer: MEDICARE

## 2024-12-06 PROCEDURE — 97110 THERAPEUTIC EXERCISES: CPT

## 2024-12-06 NOTE — PROGRESS NOTES
Errol ELIZA Malik  : 1955  Primary: Medicare Part A And B (Medicare)  Secondary: BCBS [facility]  [place] [address]  Phone: [phone]  Fax: [phone] Plan Frequency: 2 times a week for 8 weeks then 1 time a week for 4 weeks  Plan of Care/Certification Expiration Date: 25        Plan of Care/Certification Expiration Date:  Plan of Care/Certification Expiration Date: 25    Frequency/Duration: Plan Frequency: 2 times a week for 8 weeks then 1 time a week for 4 weeks      Time In/Out:   Time In: 901  Time Out: 956      PT Visit Info:    Total # of Visits to Date: 12      Visit Count:  13    OUTPATIENT PHYSICAL THERAPY:   Treatment Note 2024       Episode  (Left ankle fracture)               Treatment Diagnosis:    Pain in left ankle and joints of left foot  Difficulty in walking, not elsewhere classified  Left-sided muscle weakness  Medical/Referring Diagnosis:    Displaced trimalleolar fracture of left lower leg, subsequent encounter for closed fracture with ro*    Referring Physician:  Shady Hernandez Jr., MD MD Orders:  PT Eval and Treat   Return MD Appt:     Date of Onset:  Onset Date: 24     Allergies:   Patient has no known allergies.  Restrictions/Precautions:   None      Interventions Planned (Treatment may consist of any combination of the following):  Current Treatment Recommendations: ROM; Strengthening; Balance training; Functional mobility training; Transfer training; Gait training; Stair training; Manual; Neuromuscular re-education; Modalities; Home exercise program; Positioning; Aquatics; Dry needling; Therapeutic activities        Subjective Comments:   Patient reports that he is doing well today. Not feeling pain and starting to feel stronger    Initial Pain Level::      0/10  Post Session Pain Level:        0/10  Medications Last Reviewed:  2024  Updated Objective Findings:  None

## 2024-12-09 ENCOUNTER — HOSPITAL ENCOUNTER (OUTPATIENT)
Dept: PHYSICAL THERAPY | Age: 69
Setting detail: RECURRING SERIES
Discharge: HOME | End: 2024-12-12
Payer: MEDICARE

## 2024-12-09 PROCEDURE — 97140 MANUAL THERAPY 1/> REGIONS: CPT

## 2024-12-09 PROCEDURE — 97110 THERAPEUTIC EXERCISES: CPT

## 2024-12-09 NOTE — PROGRESS NOTES
Today  Treatment   MANUAL THERAPY: (10 minutes):   Joint mobilization and Soft tissue mobilization was utilized and necessary because of the patient's restricted joint motion and restricted motion of soft tissue.    -Left ankle mobilization and edema mobilization  THERAPEUTIC EXERCISE: (45 minutes):    Exercises per grid below to improve mobility, strength, balance, and coordination.  Required moderate visual, verbal, and tactile cues to promote proper body alignment, promote proper body posture, and promote proper body mechanics.  Progressed resistance, range, repetitions, and complexity of movement as indicated.     Date:  11/8/24 Date:  11/13/24 Date:  11/15/24 Date:  11/18/24 Date:  11/20/24 Date:  11/25/24 Date:  12/2/24 Date:  12/6/24 Date:  12/9/24   Activity/Exercise            Recumbent bike SCI FIT Bike L3 x 5 min SCI FIT Bike L3 x 5 min SCI FIT Bike L3 x 6 min SCI FIT Bike L3 x 6 min SCI FIT Bike L3 x 8 min SCI FIT Bike L3 x 8 min SCI FIT Bike L3 x 8 min SCI FIT Bike L3 x 8 min SCI FIT Bike L3 x 8 min   PF/DF And inversion/eversion2# ankle weight x 20 each,  and circles CW/CCW And inversion/eversion2# ankle weight x 20 each,  and circles CW/CCW And inversion/eversion2# ankle weight x 20 each,  and circles CW/CCW And inversion/eversion2# ankle weight x 20 each,  and circles CW/CCW And inversion/eversion2# ankle weight x 20 each,  and circles CW/CCW       Hip adduction and abduction        With bridge x 30 each and green band for abduction With bridge x 30 each and green band for abduction   Standing heel/toe rocks Seated in chair focusing on slow control x 15, with toes on small ledge Seated in chair focusing on slow control x 15, with toes on small ledge Standing regular x 20  On foam x 10 Seated x 20 Seated x 20 Standing on foam x 15  X 30 X 30   Standing gastroc stretch at rail At incline [id number] each At incline [id number] each At incline [id number] each At incline [id number] each At incline

## 2024-12-11 ENCOUNTER — HOSPITAL ENCOUNTER (OUTPATIENT)
Dept: PHYSICAL THERAPY | Age: 69
Setting detail: RECURRING SERIES
Discharge: HOME | End: 2024-12-14
Payer: MEDICARE

## 2024-12-11 PROCEDURE — 97110 THERAPEUTIC EXERCISES: CPT

## 2024-12-11 PROCEDURE — 97140 MANUAL THERAPY 1/> REGIONS: CPT

## 2024-12-11 NOTE — PROGRESS NOTES
Errol Malik  : 1955  Primary: Medicare Part A And B (Medicare)  [place]  [facility] @ [facility] [place] [address]  Phone: [phone]  Fax: [phone] Plan Frequency: 2 times a week for 8 weeks then 1 time a week for 4 weeks  Plan of Care/Certification Expiration Date: 25        Plan of Care/Certification Expiration Date:  Plan of Care/Certification Expiration Date: 25    Frequency/Duration: Plan Frequency: 2 times a week for 8 weeks then 1 time a week for 4 weeks      Time In/Out:   Time In: 858  Time Out: 956      PT Visit Info:    Total # of Visits to Date: 12      Visit Count:  15    OUTPATIENT PHYSICAL THERAPY:   Treatment Note 2024       Episode  (Left ankle fracture)               Treatment Diagnosis:    Pain in left ankle and joints of left foot  Difficulty in walking, not elsewhere classified  Left-sided muscle weakness  Medical/Referring Diagnosis:    Displaced trimalleolar fracture of left lower leg, subsequent encounter for closed fracture with ro*    Referring Physician:  Shady Hernandez Jr., MD MD Orders:  PT Eval and Treat   Return MD Appt:     Date of Onset:  Onset Date: 24     Allergies:   Patient has no known allergies.  Restrictions/Precautions:   None      Interventions Planned (Treatment may consist of any combination of the following):  Current Treatment Recommendations: ROM; Strengthening; Balance training; Functional mobility training; Transfer training; Gait training; Stair training; Manual; Neuromuscular re-education; Modalities; Home exercise program; Positioning; Aquatics; Dry needling; Therapeutic activities        Subjective Comments:   Patient reports that he is doing well today. He reports doing well with propping his legs up today. But still a little fatigued    Initial Pain Level::      0/10  Post Session Pain Level:        0/10  Medications Last

## 2024-12-16 ENCOUNTER — HOSPITAL ENCOUNTER (OUTPATIENT)
Dept: PHYSICAL THERAPY | Age: 69
Setting detail: RECURRING SERIES
Discharge: HOME | End: 2024-12-19
Payer: MEDICARE

## 2024-12-16 PROCEDURE — 97110 THERAPEUTIC EXERCISES: CPT

## 2024-12-16 NOTE — PROGRESS NOTES
pamela with toe separator.    Communication/Consultation:  Spoke with patient and spouse in regards to assessment of toe separator use in shoes.  Equipment provided today:  None  Access Code [email or web address]   Recommendations/Intent for next treatment session: Next visit will focus on Transfers, LE strength and ankle strength and stability.    >Total Treatment Billable Duration:  55 minutes   Time In: 1045  Time Out: 1140    Dawn Alvarado PTA         Charge Capture  Events  [facility] Portal  Appt Desk  Attendance Report     Future Appointments   Date Time Provider Department Center   12/19/2024  9:15 AM Dawn Alvarado PTA [facility]   12/27/2024 10:00 AM Dawn Alvarado PTA [facility]   12/30/2024  9:15 AM Dawn Alvarado, PTA [facility]   1/2/2025  8:00 AM Osman Askew, PT [facility]   1/6/2025  9:00 AM Osman Askew, PT [facility]   1/8/2025  9:00 AM Osman Askew, PT [facility]   1/13/2025  9:00 AM Osman Askew, PT [facility]   1/16/2025  9:00 AM Osman Askew, PT [facility]   6/6/2025  9:30 AM Andrew Sánchez MD [facility]

## 2024-12-19 ENCOUNTER — HOSPITAL ENCOUNTER (OUTPATIENT)
Dept: PHYSICAL THERAPY | Age: 69
Setting detail: RECURRING SERIES
Discharge: HOME | End: 2024-12-22
Payer: MEDICARE

## 2024-12-19 PROCEDURE — 97110 THERAPEUTIC EXERCISES: CPT

## 2024-12-19 NOTE — PROGRESS NOTES
12/19/2024  Updated Objective Findings:  None Today  Treatment     THERAPEUTIC EXERCISE: (40 minutes):    Exercises per grid below to improve mobility, strength, balance, and coordination.  Required moderate visual, verbal, and tactile cues to promote proper body alignment, promote proper body posture, and promote proper body mechanics.  Progressed resistance, range, repetitions, and complexity of movement as indicated.  Checked silicone toe separators (and bandaid) for hammertoe and pressure blister on 2nd digit. Discussed changing lacing strategies for shoes to alleviate pressure on toes.       Date:  11/25/24 Date:  12/2/24 Date:  12/6/24 Date:  12/9/24 Date:  12/11/24 Date:  12/16/24 Date:  12/19/24   Activity/Exercise          Recumbent bike SCI FIT Bike L3 x 8 min SCI FIT Bike L3 x 8 min SCI FIT Bike L3 x 8 min SCI FIT Bike L3 x 8 min SCI FIT Bike L3 x 8 min Level 2 x 8 SCI fit stepper     PF/DF          Hip adduction and abduction   With bridge x 30 each and green band for abduction With bridge x 30 each and green band for abduction With bridge x 30 each and blueband for abduction     Standing heel/toe rocks Standing on foam x 15  X 30 X 30  x30    Standing gastroc stretch at rail At incline [id number] each At incline 1f98pikpemd each At incline 1w51jokwgng each At incline 0q54wdyvdxm each At incline 2c99jtrestq each At incline 6x69fcbfjei each At incline 7y22glnxswy each   BAPS wobble board   Fitter board side to waqar touches in standing x 3 min Fitter board side to waqar touches in standing x 3 min   Small circular wobble board x 30 each    Seated ankle AROM          Standing balance Standing march at rail with toe tap, tandem stance [id number] each Standing high march at rail, 1-2 second hold especially focusing on LLE stance control Standing high march at rail, 1-2 second hold especially focusing on LLE stance control Standing high march at rail, 1-2 second hold especially focusing on LLE stance control

## 2024-12-27 ENCOUNTER — HOSPITAL ENCOUNTER (OUTPATIENT)
Dept: PHYSICAL THERAPY | Age: 69
Setting detail: RECURRING SERIES
Discharge: HOME | End: 2024-12-30
Payer: MEDICARE

## 2024-12-27 PROCEDURE — 97110 THERAPEUTIC EXERCISES: CPT

## 2024-12-27 NOTE — PROGRESS NOTES
Errol Malik  : 1955  Primary: Medicare Part A [place] B (Medicare)  [place]  [facility] @ [facility] [place] [address]  Phone: [phone]  Fax: [phone] Plan Frequency: 2 times a week for 8 weeks then 1 time a week for 4 weeks  Plan of Care/Certification Expiration Date: 25        Plan of Care/Certification Expiration Date:  Plan of Care/Certification Expiration Date: 25    Frequency/Duration: Plan Frequency: 2 times a week for 8 weeks then 1 time a week for 4 weeks      Time In/Out:   Time In: 1005  Time Out: 1105      PT Visit Info:    Total # of Visits to Date: 18  Progress Note Counter: 8      Visit Count:  18    OUTPATIENT PHYSICAL THERAPY:   Treatment Note 2024       Episode  (Left ankle fracture)               Treatment Diagnosis:    Pain in left ankle and joints of left foot  Difficulty in walking, not elsewhere classified  Left-sided muscle weakness  Medical/Referring Diagnosis:    Displaced trimalleolar fracture of left lower leg, subsequent encounter for closed fracture with ro*    Referring Physician:  Shady Hernandez Jr., MD MD Orders:  PT Eval and Treat   Return MD Appt:     Date of Onset:  Onset Date: 24     Allergies:   Patient has no known allergies.  Restrictions/Precautions:   None      Interventions Planned (Treatment may consist of any combination of the following):  Current Treatment Recommendations: ROM; Strengthening; Balance training; Functional mobility training; Transfer training; Gait training; Stair training; Manual; Neuromuscular re-education; Modalities; Home exercise program; Positioning; Aquatics; Dry needling; Therapeutic activities        Subjective Comments:   Patient reports his toes feel much better and he feels a little more steady on his feet the past couple of days.  Patients spouse notes that the new lacing strategy really seemed to help secure his

## 2024-12-30 ENCOUNTER — HOSPITAL ENCOUNTER (OUTPATIENT)
Dept: PHYSICAL THERAPY | Age: 69
Setting detail: RECURRING SERIES
Discharge: HOME | End: 2025-01-02
Payer: MEDICARE

## 2024-12-30 PROCEDURE — 97110 THERAPEUTIC EXERCISES: CPT

## 2024-12-30 NOTE — PROGRESS NOTES
Errol Malik  : 1955  Primary: Medicare Part A And B (Medicare)  [place]  [facility] @ [facility] [place] [address]  Phone: [phone]  Fax: [phone] Plan Frequency: 2 times a week for 8 weeks then 1 time a week for 4 weeks  Plan of Care/Certification Expiration Date: 25        Plan of Care/Certification Expiration Date:  Plan of Care/Certification Expiration Date: 25    Frequency/Duration: Plan Frequency: 2 times a week for 8 weeks then 1 time a week for 4 weeks      Time In/Out:   Time In: 0910  Time Out: 1000      PT Visit Info:    Total # of Visits to Date: 19  Progress Note Counter: 9      Visit Count:  19    OUTPATIENT PHYSICAL THERAPY:   Treatment Note 2024       Episode  (Left ankle fracture)               Treatment Diagnosis:    Pain in left ankle and joints of left foot  Difficulty in walking, not elsewhere classified  Left-sided muscle weakness  Medical/Referring Diagnosis:    Displaced trimalleolar fracture of left lower leg, subsequent encounter for closed fracture with ro*    Referring Physician:  Shady Hernandez Jr., MD MD Orders:  PT Eval and Treat   Return MD Appt:     Date of Onset:  Onset Date: 24     Allergies:   Patient has no known allergies.  Restrictions/Precautions:   None      Interventions Planned (Treatment may consist of any combination of the following):  Current Treatment Recommendations: ROM; Strengthening; Balance training; Functional mobility training; Transfer training; Gait training; Stair training; Manual; Neuromuscular re-education; Modalities; Home exercise program; Positioning; Aquatics; Dry needling; Therapeutic activities        Subjective Comments:   Patient reports he got out and exercised more this weekend, walking around the block several times.  No complaints today.  He reports he is now using stairs to get into gym, but using ramp to descend,

## 2025-01-02 ENCOUNTER — APPOINTMENT (OUTPATIENT)
Dept: PHYSICAL THERAPY | Age: 70
End: 2025-01-02
Payer: MEDICARE

## 2025-01-02 ENCOUNTER — HOSPITAL ENCOUNTER (OUTPATIENT)
Dept: PHYSICAL THERAPY | Age: 70
Setting detail: RECURRING SERIES
Discharge: HOME | End: 2025-01-05
Payer: MEDICARE

## 2025-01-02 PROCEDURE — 97110 THERAPEUTIC EXERCISES: CPT

## 2025-01-02 NOTE — PROGRESS NOTES
Mini squats X 10 X 10        Hip abduction standing 2 x 10 B 2 x 10 B Seated green loop 2x10 slow with 3 second hold       gait   Forward and turns out in sanchez x 300 feet, up and down ramp x 2 with rail focusing on with cueing       TKE      Blue theraband hold 5 2x10 with focus on keeping weight shifted onto L LE  Blue theraband hold 5 2x10 with focus on keeping weight shifted onto L LE, RLE slightly forward Blue theraband hold 5 x10 each on floor and on foam pad with focus on keeping weight shifted onto L LE, RLE slightly forward         Treatment/Session Summary:    Treatment Assessment: Patient continues to progress with endurance, balance and control for L LE.   Communication/Consultation:  Spoke with patient and spouse in regards to keeping weight shifted onto L LE for standing exercises as much as possible.  Equipment provided today:  None blue theraband for TKE  Recommendations/Intent for next treatment session: Next visit will focus on Transfers, LE strength and ankle strength and stability.    >Total Treatment Billable Duration:  45 minutes   Time In: 1155  Time Out: 1245    Dawn Alvarado PTA         Charge Capture  Events  [facility] Portal  Appt Desk  Attendance Report     Future Appointments   Date Time Provider Department Center   1/6/2025  9:00 AM Osman Askew, PT [facility]   1/8/2025  9:00 AM Osman Askew, PT [facility]   1/13/2025  9:00 AM Osman Askew, PT [facility]   1/16/2025  9:00 AM Osman Askew, PT [facility]   6/6/2025  9:30 AM Andrew Sánchez MD [facility]

## 2025-01-06 ENCOUNTER — HOSPITAL ENCOUNTER (OUTPATIENT)
Dept: PHYSICAL THERAPY | Age: 70
Setting detail: RECURRING SERIES
Discharge: HOME | End: 2025-01-09
Payer: MEDICARE

## 2025-01-06 PROCEDURE — 97110 THERAPEUTIC EXERCISES: CPT

## 2025-01-06 NOTE — PROGRESS NOTES
Errol Malik  : 1955  Primary: Medicare Part A [place] B (Medicare)  [place]: [place]  [facility] [place] [facility] [place] [address]  Phone: [phone]  Fax: [phone] Plan Frequency: 2 times a week for 4 weeks then 1 time a week for 8 weeks  Plan of Care/Certification Expiration Date: 25        Plan of Care/Certification Expiration Date:  Plan of Care/Certification Expiration Date: 25    Frequency/Duration: Plan Frequency: 2 times a week for 4 weeks then 1 time a week for 8 weeks      Time In/Out:   Time In: 0900  Time Out: 0950      PT Visit Info:    Total # of Visits to Date: 20  Progress Note Counter: 10      Visit Count:  21    OUTPATIENT PHYSICAL THERAPY:   Treatment Note 2025       Episode  (Left ankle fracture)               Treatment Diagnosis:    Pain in left ankle and joints of left foot  Difficulty in walking, not elsewhere classified  Left-sided muscle weakness  Medical/Referring Diagnosis:    Displaced trimalleolar fracture of left lower leg, subsequent encounter for closed fracture with ro*    Referring Physician:  Shady Hernandez Jr., MD MD Orders:  PT Eval and Treat   Return MD Appt:     Date of Onset:  Onset Date: 24     Allergies:   Patient has no known allergies.  Restrictions/Precautions:   None      Interventions Planned (Treatment may consist of any combination of the following):  Current Treatment Recommendations: ROM; Strengthening; Balance training; Functional mobility training; Transfer training; Gait training; Stair training; Manual; Neuromuscular re-education; Modalities; Home exercise program; Positioning; Aquatics; Dry needling; Therapeutic activities        Subjective Comments:   Patient reports no issues with his feet and is doing ok. Still trying to walking more when the weather permits    Initial Pain Level::      0/10  Post Session Pain Level:        0/10  Medications Last
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
## 2025-01-08 ENCOUNTER — HOSPITAL ENCOUNTER (OUTPATIENT)
Dept: PHYSICAL THERAPY | Age: 70
Setting detail: RECURRING SERIES
Discharge: HOME | End: 2025-01-11
Payer: MEDICARE

## 2025-01-08 PROCEDURE — 97140 MANUAL THERAPY 1/> REGIONS: CPT

## 2025-01-08 PROCEDURE — 97110 THERAPEUTIC EXERCISES: CPT

## 2025-01-08 NOTE — PROGRESS NOTES
[id number] each At incline 1g51oykbrqu each At incline x 60 seconds each B At incline x 60 seconds each B At incline x 60 seconds each B At incline x 60 seconds each B   BAPS wobble board Fitter board side to waqar touches in standing x 3 min   Small circular wobble board x 30 each  Small circular wobble board x 30 each   Large circular wobble board x 10 each direction Large circular wobble board x 10 each direction    Seated ankle AROM            Standing balance Standing high march at rail, 1-2 second hold especially focusing on LLE stance control Standing high march at rail, 1-2 second hold especially focusing on LLE stance control Standing high march at rail, 1-2 second hold especially focusing on LLE stance control  March at rail on foam pad x 20 March at rail on foam pad x 20, relaxed stance with eyes closed x 60 seconds, feet together eyes open 30 seconds, eyes closed 30 seconds (CGA) March at rail on foam pad x 20, relaxed stance with eyes closed x 60 seconds, feet together eyes open 30 seconds, eyes closed 30 seconds (CGA) March at rail on foam pad x 20, relaxed stance with eyes closed x 60 seconds, feet together eyes open 30 seconds, eyes closed 30 seconds (CGA) March at rail on foam pad x 20, relaxed stance with eyes closed x 60 seconds, feet together eyes open 30 seconds, eyes closed 30 seconds (CGA)  Fitter board side to side and front to back x 2 min each   Side stepping 6 x back and forth along mat table, stepping over small weights 6 x back and forth along mat table, stepping over small weights At low countertop x 20 feet x 3 passes each direction, with band around ankles x 5 passes 4 steps each, orange loop At low countertop x 20 feet x 3 passes each direction, with band around ankles x 5 passes 4 steps each, orange loop  At low countertop x 20 feet x 3 passes each direction, with band around ankles x 5 passes 4 steps each, orange loop with band around ankles x 5 passes 4 steps each, orange loop with

## 2025-01-13 ENCOUNTER — HOSPITAL ENCOUNTER (OUTPATIENT)
Dept: PHYSICAL THERAPY | Age: 70
Setting detail: RECURRING SERIES
Discharge: HOME | End: 2025-01-16
Payer: MEDICARE

## 2025-01-13 PROCEDURE — 97110 THERAPEUTIC EXERCISES: CPT

## 2025-01-13 PROCEDURE — 97140 MANUAL THERAPY 1/> REGIONS: CPT

## 2025-01-13 NOTE — PROGRESS NOTES
incline x 60 seconds each B At incline x 60 seconds each B At incline x 60 seconds each B At incline x 60 seconds each B    BAPS wobble board Small circular wobble board x 30 each  Small circular wobble board x 30 each   Large circular wobble board x 10 each direction Large circular wobble board x 10 each direction      Seated ankle AROM           Standing balance  March at rail on foam pad x 20 March at rail on foam pad x 20, relaxed stance with eyes closed x 60 seconds, feet together eyes open 30 seconds, eyes closed 30 seconds (CGA) March at rail on foam pad x 20, relaxed stance with eyes closed x 60 seconds, feet together eyes open 30 seconds, eyes closed 30 seconds (CGA) March at rail on foam pad x 20, relaxed stance with eyes closed x 60 seconds, feet together eyes open 30 seconds, eyes closed 30 seconds (CGA) March at rail on foam pad x 20, relaxed stance with eyes closed x 60 seconds, feet together eyes open 30 seconds, eyes closed 30 seconds (CGA)  Fitter board side to side and front to back x 2 min each March at rail on foam pad x 20, relaxed stance with eyes closed x 60 seconds, feet together eyes open 30 seconds, eyes closed 30 seconds (CGA)  Fitter board side to side and front to back x 2 min each    Side stepping At low countertop x 20 feet x 3 passes each direction, with band around ankles x 5 passes 4 steps each, orange loop  At low countertop x 20 feet x 3 passes each direction, with band around ankles x 5 passes 4 steps each, orange loop with band around ankles x 5 passes 4 steps each, orange loop with band around ankles x 5 passes 4 steps each, orange loop with band around ankles x 5 passes 4 steps each, orange loop     Forward step and return alternating           Sit to stand  With RLE slightly forward and L UE only x 5,  and with BUE light touch x 5 With RLE slightly forward from large plinth 2x5, no UE With RLE slightly forward from large plinth 2x5, no UE  X10 at 22 inch, using LUE only, x 5

## 2025-01-16 ENCOUNTER — HOSPITAL ENCOUNTER (OUTPATIENT)
Dept: PHYSICAL THERAPY | Age: 70
Setting detail: RECURRING SERIES
Discharge: HOME | End: 2025-01-19
Payer: MEDICARE

## 2025-01-16 PROCEDURE — 97110 THERAPEUTIC EXERCISES: CPT

## 2025-01-16 PROCEDURE — 97140 MANUAL THERAPY 1/> REGIONS: CPT

## 2025-01-16 ASSESSMENT — PAIN SCALES - GENERAL: PAINLEVEL_OUTOF10: 0

## 2025-01-16 NOTE — PROGRESS NOTES
Errol Malik  : 1955  Primary: Medicare Part A And B (Medicare)  [place]  [facility] @ [facility] [place] [address]  Phone: [phone]  Fax: [phone] Plan Frequency: 2 times a week for 4 weeks then 1 time a week for 8 weeks  Plan of Care/Certification Expiration Date: 25        Plan of Care/Certification Expiration Date:  Plan of Care/Certification Expiration Date: 25    Frequency/Duration: Plan Frequency: 2 times a week for 4 weeks then 1 time a week for 8 weeks      Time In/Out:   Time In: 0908  Time Out: 1000      PT Visit Info:    Total # of Visits to Date: 20  Progress Note Counter: 10      Visit Count:  24    OUTPATIENT PHYSICAL THERAPY:   Treatment Note 2025       Episode  (Left ankle fracture)               Treatment Diagnosis:    Pain in left ankle and joints of left foot  Difficulty in walking, not elsewhere classified  Left-sided muscle weakness  Medical/Referring Diagnosis:    Displaced trimalleolar fracture of left lower leg, subsequent encounter for closed fracture with ro*    Referring Physician:  Shady Hernandez Jr., MD MD Orders:  PT Eval and Treat   Return MD Appt:     Date of Onset:  Onset Date: 24     Allergies:   Patient has no known allergies.  Restrictions/Precautions:   None      Interventions Planned (Treatment may consist of any combination of the following):  Current Treatment Recommendations: ROM; Strengthening; Balance training; Functional mobility training; Transfer training; Gait training; Stair training; Manual; Neuromuscular re-education; Modalities; Home exercise program; Positioning; Aquatics; Dry needling; Therapeutic activities        Subjective Comments:   Patient reports he is a little late today due to traffic. He is doing ok with some questions about his shoe and tying it for his toe    Initial Pain Level::     0/10  Post Session Pain Level:

## 2025-01-20 ENCOUNTER — HOSPITAL ENCOUNTER (OUTPATIENT)
Dept: PHYSICAL THERAPY | Age: 70
Setting detail: RECURRING SERIES
Discharge: HOME | End: 2025-01-23
Payer: MEDICARE

## 2025-01-20 PROCEDURE — 97140 MANUAL THERAPY 1/> REGIONS: CPT

## 2025-01-20 PROCEDURE — 97110 THERAPEUTIC EXERCISES: CPT

## 2025-01-20 NOTE — PROGRESS NOTES
Errol Malik  : 1955  Primary: Medicare Part [place] B (Medicare)  [place] [employer]  [facility] [place] [address]  Phone: [phone]  Fax: [phone] Plan Frequency: 2 times a week for 4 weeks then 1 time a week for 8 weeks  Plan of Care/Certification Expiration Date: 25        Plan of Care/Certification Expiration Date:  Plan of Care/Certification Expiration Date: 25    Frequency/Duration: Plan Frequency: 2 times a week for 4 weeks then 1 time a week for 8 weeks      Time In/Out:   Time In: 830  Time Out: 920      PT Visit Info:    Total # of Visits to Date: 25  Progress Note Counter: 4      Visit Count:  25    OUTPATIENT PHYSICAL THERAPY:   Treatment Note 2025       Episode  (Left ankle fracture)               Treatment Diagnosis:    Pain in left ankle and joints of left foot  Difficulty in walking, not elsewhere classified  Left-sided muscle weakness  Medical/Referring Diagnosis:    Displaced trimalleolar fracture of left lower leg, subsequent encounter for closed fracture with ro*    Referring Physician:  Shady Hernandez Jr., MD MD Orders:  PT Eval and Treat   Return MD Appt:     Date of Onset:  Onset Date: 24     Allergies:   Patient has no known allergies.  Restrictions/Precautions:   None      Interventions Planned (Treatment may consist of any combination of the following):  Current Treatment Recommendations: ROM; Strengthening; Balance training; Functional mobility training; Transfer training; Gait training; Stair training; Manual; Neuromuscular re-education; Modalities; Home exercise program; Positioning; Aquatics; Dry needling; Therapeutic activities        Subjective Comments:   Patient reports he had his heart catheterization on Friday and his cardiologist says he needs a bypass.  He only has restrictions for L UE lifting at this time x 7 days post-cath and feels good to do

## 2025-01-22 ENCOUNTER — APPOINTMENT (OUTPATIENT)
Dept: PHYSICAL THERAPY | Age: 70
End: 2025-01-22
Payer: MEDICARE

## 2025-01-27 ENCOUNTER — HOSPITAL ENCOUNTER (OUTPATIENT)
Dept: PHYSICAL THERAPY | Age: 70
Setting detail: RECURRING SERIES
Discharge: HOME | End: 2025-01-30
Payer: MEDICARE

## 2025-01-27 PROCEDURE — 97110 THERAPEUTIC EXERCISES: CPT

## 2025-01-27 NOTE — PROGRESS NOTES
stretch to review for HEP x 60 seconds each At incline x 60 seconds each B and standing lunge stretch to review for HEP x 60 seconds each   BAPS wobble board Large circular wobble board x 10 each direction Large circular wobble board x 10 each direction   Large circular wobble board x 10 each direction Small wobble board x 20 each Small wobble board x 20 each   Seated ankle AROM      Ankle alphabet 1.5# x 1, reviewed for HEP    Standing balance March at rail on foam pad x 20, relaxed stance with eyes closed x 60 seconds, feet together eyes open 30 seconds, eyes closed 30 seconds (CGA) March at rail on foam pad x 20, relaxed stance with eyes closed x 60 seconds, feet together eyes open 30 seconds, eyes closed 30 seconds (CGA) March at rail on foam pad x 20, relaxed stance with eyes closed x 60 seconds, feet together eyes open 30 seconds, eyes closed 30 seconds (CGA)  Fitter board side to side and front to back x 2 min each March at rail on foam pad x 20, relaxed stance with eyes closed x 60 seconds, feet together eyes open 30 seconds, eyes closed 30 seconds (CGA)  Fitter board side to side and front to back x 2 min each March at rail on foam pad x 20, relaxed stance with eyes closed x 60 seconds, feet together eyes open 30 seconds, eyes closed 30 seconds (CGA)  Fitter board side to side and front to back x 2 min each  March at rail on foam pad x 20, relaxed stance with eyes closed x 60 seconds, feet together eyes open 30 seconds, eyes closed 30 seconds (CGA), weight shifts, tandem stance 60 seconds each B   Side stepping with band around ankles x 5 passes 4 steps each, orange loop with band around ankles x 5 passes 4 steps each, orange loop with band around ankles x 5 passes 4 steps each, orange loop    with band around ankles x 5 passes 8 steps each, green loop   Forward step and return alternating          Sit to stand   X10 at 22 inch, using LUE only, x 5 at 21 inch then x 5 at 20 inches  Moved to 19 inches and

## 2025-01-29 ENCOUNTER — HOSPITAL ENCOUNTER (OUTPATIENT)
Dept: PHYSICAL THERAPY | Age: 70
Setting detail: RECURRING SERIES
Discharge: HOME | End: 2025-02-01
Payer: MEDICARE

## 2025-01-29 PROCEDURE — 97110 THERAPEUTIC EXERCISES: CPT

## 2025-01-29 PROCEDURE — 97140 MANUAL THERAPY 1/> REGIONS: CPT

## 2025-01-29 NOTE — PROGRESS NOTES
Errol Malik  : 1955  Primary: Medicare Part A [place] B (Medicare)  [place]  [facility] @ [facility] [place] [address]  Phone: [phone]  Fax: [phone] Plan Frequency: 2 times a week for 4 weeks then 1 time a week for 8 weeks  Plan of Care/Certification Expiration Date: 25        Plan of Care/Certification Expiration Date:  Plan of Care/Certification Expiration Date: 25    Frequency/Duration: Plan Frequency: 2 times a week for 4 weeks then 1 time a week for 8 weeks      Time In/Out:   Time In: 1000  Time Out: 1058      PT Visit Info:    Total # of Visits to Date: 25  Progress Note Counter: 4      Visit Count:  27    OUTPATIENT PHYSICAL THERAPY:   Treatment Note 2025       Episode  (Left ankle fracture)               Treatment Diagnosis:    Pain in left ankle and joints of left foot  Difficulty in walking, not elsewhere classified  Left-sided muscle weakness  Medical/Referring Diagnosis:    Displaced trimalleolar fracture of left lower leg, subsequent encounter for closed fracture with ro*    Referring Physician:  Shady Hernandez Jr., MD MD Orders:  PT Eval and Treat   Return MD Appt:     Date of Onset:  Onset Date: 24     Allergies:   Patient has no known allergies.  Restrictions/Precautions:   None      Interventions Planned (Treatment may consist of any combination of the following):  Current Treatment Recommendations: ROM; Strengthening; Balance training; Functional mobility training; Transfer training; Gait training; Stair training; Manual; Neuromuscular re-education; Modalities; Home exercise program; Positioning; Aquatics; Dry needling; Therapeutic activities        Subjective Comments:   Patient reports that he is doing better with getting up and down. Still getting ready for his bypass surgery coming up.    Initial Pain Level::      0/10  Post Session Pain Level:

## 2025-02-03 ENCOUNTER — HOSPITAL ENCOUNTER (OUTPATIENT)
Dept: PHYSICAL THERAPY | Age: 70
Setting detail: RECURRING SERIES
Discharge: HOME | End: 2025-02-06
Payer: MEDICARE

## 2025-02-03 PROCEDURE — 97110 THERAPEUTIC EXERCISES: CPT

## 2025-02-03 NOTE — PROGRESS NOTES
Errol Malik  : 1955  Primary: Medicare Part [place] B (Medicare)  [place]: [place]  [facility] [place]  [address] [place] [address]  Phone: [phone]  Fax: [phone] Plan Frequency: 2 times a week for 4 weeks then 1 time a week for 8 weeks  Plan of Care/Certification Expiration Date: 25        Plan of Care/Certification Expiration Date:  Plan of Care/Certification Expiration Date: 25    Frequency/Duration: Plan Frequency: 2 times a week for 4 weeks then 1 time a week for 8 weeks      Time In/Out:   Time In: 955  Time Out:       PT Visit Info:    Total # of Visits to Date: 25  Progress Note Counter: 4      Visit Count:  28    OUTPATIENT PHYSICAL THERAPY:   Treatment Note 2/3/2025       Episode  (Left ankle fracture)               Treatment Diagnosis:    Pain in left ankle and joints of left foot  Difficulty in walking, not elsewhere classified  Left-sided muscle weakness  Medical/Referring Diagnosis:    Displaced trimalleolar fracture of left lower leg, subsequent encounter for closed fracture with ro*    Referring Physician:  Shady Hernandez Jr., MD MD Orders:  PT Eval and Treat   Return MD Appt:     Date of Onset:  Onset Date: 24     Allergies:   Patient has no known allergies.  Restrictions/Precautions:   None      Interventions Planned (Treatment may consist of any combination of the following):  Current Treatment Recommendations: ROM; Strengthening; Balance training; Functional mobility training; Transfer training; Gait training; Stair training; Manual; Neuromuscular re-education; Modalities; Home exercise program; Positioning; Aquatics; Dry needling; Therapeutic activities        Subjective Comments:   Patient reports he continues to anticipate his bypass surgery and would like to stay as strong as he can in PT before he undergoes that. He feels the wobble board exercises are challenging in a way he

## 2025-02-10 ENCOUNTER — HOSPITAL ENCOUNTER (OUTPATIENT)
Dept: PHYSICAL THERAPY | Age: 70
Setting detail: RECURRING SERIES
Discharge: HOME | End: 2025-02-13
Payer: MEDICARE

## 2025-02-10 PROCEDURE — 97110 THERAPEUTIC EXERCISES: CPT

## 2025-02-10 PROCEDURE — 97140 MANUAL THERAPY 1/> REGIONS: CPT

## 2025-02-10 ASSESSMENT — PAIN SCALES - GENERAL: PAINLEVEL_OUTOF10: 0

## 2025-02-10 NOTE — PROGRESS NOTES
scrunches  2 minutes       Toe yoga  2 minutes       Towel inversion/eversion  2 minutes       Step ups 6 inch x 15 B focus on use of LE  Onto foam pad x 5 each LE Onto foam pad x 10 each LE laterally     Mini squats         Hip abduction standing 2 x 10 B with orange band        gait         TKE          Stair navigation     Up and down 4 inch x 5, using single UE leading with L LE, then step ups to L LE using single UE x 5, and attempting with no UE assist x 3    HEP review     5 minutes      MANUAL THERAPY: (10 minutes):   Joint mobilization and Soft tissue mobilization was utilized and necessary because of the patient's restricted joint motion, painful spasm, and restricted motion of soft tissue.    Edema management  mobilization and facilitation    Treatment/Session Summary:    Treatment Assessment: Patient did well with mobility and exercises. He will be discharged at this time due to having heart surgery soon.      >Total Treatment Billable Duration:  55 minutes   Time In: 1401  Time Out: 1500    Osman Askew PT         Charge Capture  Events  [email or web address] Portal  Appt Desk  Attendance Report     Future Appointments   Date Time Provider Department Center   6/6/2025  9:30 AM Andrew Sánchez MD END [facility]
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
## 2025-02-11 ENCOUNTER — APPOINTMENT (OUTPATIENT)
Dept: PHYSICAL THERAPY | Age: 70
End: 2025-02-11
Payer: MEDICARE

## 2025-02-17 ENCOUNTER — APPOINTMENT (OUTPATIENT)
Dept: PHYSICAL THERAPY | Age: 70
End: 2025-02-17
Payer: MEDICARE

## 2025-02-24 ENCOUNTER — APPOINTMENT (OUTPATIENT)
Dept: PHYSICAL THERAPY | Age: 70
End: 2025-02-24
Payer: MEDICARE

## 2025-06-02 ENCOUNTER — OFFICE VISIT (OUTPATIENT)
Dept: ENDOCRINOLOGY | Age: 70
End: 2025-06-02
Payer: MEDICARE

## 2025-06-02 VITALS
BODY MASS INDEX: 22.07 KG/M2 | HEART RATE: 91 BPM | SYSTOLIC BLOOD PRESSURE: 121 MMHG | DIASTOLIC BLOOD PRESSURE: 80 MMHG | OXYGEN SATURATION: 98 % | WEIGHT: 149 LBS | HEIGHT: 69 IN

## 2025-06-02 DIAGNOSIS — D35.2 BENIGN NEOPLASM OF PITUITARY GLAND (HCC): Primary | ICD-10-CM

## 2025-06-02 DIAGNOSIS — E23.0 HYPOPITUITARISM: ICD-10-CM

## 2025-06-02 DIAGNOSIS — E23.0 GROWTH HORMONE DEFICIENCY: ICD-10-CM

## 2025-06-02 DIAGNOSIS — E03.8 CENTRAL HYPOTHYROIDISM: ICD-10-CM

## 2025-06-02 DIAGNOSIS — M85.89 OSTEOPENIA OF MULTIPLE SITES: ICD-10-CM

## 2025-06-02 LAB
ALBUMIN SERPL-MCNC: 3.2 G/DL (ref 3.2–4.6)
ALBUMIN/GLOB SERPL: 0.8 (ref 1–1.9)
ALP SERPL-CCNC: 65 U/L (ref 40–129)
ALT SERPL-CCNC: 15 U/L (ref 8–55)
ANION GAP SERPL CALC-SCNC: 11 MMOL/L (ref 7–16)
AST SERPL-CCNC: 29 U/L (ref 15–37)
BILIRUB SERPL-MCNC: 0.2 MG/DL (ref 0–1.2)
BUN SERPL-MCNC: 33 MG/DL (ref 8–23)
CALCIUM SERPL-MCNC: 9.1 MG/DL (ref 8.8–10.2)
CHLORIDE SERPL-SCNC: 104 MMOL/L (ref 98–107)
CO2 SERPL-SCNC: 24 MMOL/L (ref 20–29)
CREAT SERPL-MCNC: 0.76 MG/DL (ref 0.8–1.3)
GLOBULIN SER CALC-MCNC: 3.9 G/DL (ref 2.3–3.5)
GLUCOSE SERPL-MCNC: 63 MG/DL (ref 70–99)
POTASSIUM SERPL-SCNC: 4.3 MMOL/L (ref 3.5–5.1)
PROT SERPL-MCNC: 7.1 G/DL (ref 6.3–8.2)
SODIUM SERPL-SCNC: 139 MMOL/L (ref 136–145)
T4 FREE SERPL-MCNC: 1.1 NG/DL (ref 0.9–1.7)

## 2025-06-02 PROCEDURE — 3074F SYST BP LT 130 MM HG: CPT | Performed by: INTERNAL MEDICINE

## 2025-06-02 PROCEDURE — G8420 CALC BMI NORM PARAMETERS: HCPCS | Performed by: INTERNAL MEDICINE

## 2025-06-02 PROCEDURE — 3079F DIAST BP 80-89 MM HG: CPT | Performed by: INTERNAL MEDICINE

## 2025-06-02 PROCEDURE — 1123F ACP DISCUSS/DSCN MKR DOCD: CPT | Performed by: INTERNAL MEDICINE

## 2025-06-02 PROCEDURE — 1036F TOBACCO NON-USER: CPT | Performed by: INTERNAL MEDICINE

## 2025-06-02 PROCEDURE — 1159F MED LIST DOCD IN RCRD: CPT | Performed by: INTERNAL MEDICINE

## 2025-06-02 PROCEDURE — G8427 DOCREV CUR MEDS BY ELIG CLIN: HCPCS | Performed by: INTERNAL MEDICINE

## 2025-06-02 PROCEDURE — 3017F COLORECTAL CA SCREEN DOC REV: CPT | Performed by: INTERNAL MEDICINE

## 2025-06-02 PROCEDURE — G2211 COMPLEX E/M VISIT ADD ON: HCPCS | Performed by: INTERNAL MEDICINE

## 2025-06-02 PROCEDURE — 99214 OFFICE O/P EST MOD 30 MIN: CPT | Performed by: INTERNAL MEDICINE

## 2025-06-02 RX ORDER — EVOLOCUMAB 140 MG/ML
140 INJECTION, SOLUTION SUBCUTANEOUS
COMMUNITY
Start: 2025-04-23

## 2025-06-02 RX ORDER — LEVOTHYROXINE SODIUM 88 UG/1
88 TABLET ORAL
Qty: 90 TABLET | Refills: 3 | Status: SHIPPED
Start: 2025-06-02 | End: 2025-06-03 | Stop reason: SDUPTHER

## 2025-06-02 RX ORDER — ATORVASTATIN CALCIUM 80 MG/1
80 TABLET, FILM COATED ORAL DAILY
Qty: 90 TABLET | Refills: 3
Start: 2025-06-02

## 2025-06-02 ASSESSMENT — ENCOUNTER SYMPTOMS
CONSTIPATION: 0
DIARRHEA: 0

## 2025-06-02 NOTE — PROGRESS NOTES
GIANCARLO Sánchez MD, [facility] ENDOCRINOLOGY   AND   THYROID NODULE CLINIC            Reason for visit: Follow-up of hypopituitarism      ASSESSMENT AND PLAN:    1. Benign neoplasm of pituitary gland (HCC)  He has clear evidence of central hypothyroidism and is appropriately treated.  MRI in January 2020 demonstrated a small pituitary adenoma.  Repeat MRI in December 2020 was normal.  Cosyntropin stimulation test in December 2020 was normal, excluding adrenal insufficiency.  He also has a low IGF-I level and probably has growth hormone deficiency; he has no attributable symptoms, and this requires no treatment.  Follow up in 6 months with repeat labs.  - T4, Free; Future  - Comprehensive Metabolic Panel; Future    2. Hypopituitarism (HCC)    3. Central hypothyroidism  Dr. Lugo recently adjusted his levothyroxine dose from 75 mcg daily to 88 mcg daily.  I do not know why this change was made, as his free T4 level was normal (his progress note indicated that he has \"uncontrolled thyroid function\"..  As I have indicated in the past, in patients with central hypothyroidism, only free T4 should be checked.  Other providers continue to check TSH.  TSH should not be checked, as it is unreliable in this setting and confuses the clinical picture.  I will have him check free T4 today.  I have asked him to have other physicians call me if they feel that his levothyroxine dose should be adjusted.  - levothyroxine (SYNTHROID) 88 MCG tablet; Take 1 tablet by mouth every morning (before breakfast)  Dispense: 90 tablet; Refill: 3    4. Growth hormone deficiency (HCC)  As above.    5. Osteopenia of multiple sites  I recommend that this be monitored closely with serial bone densitometry, as he does have untreated growth hormone deficiency which is a risk factor for loss of bone density.      Follow-up and Dispositions    Return in about 6 months (around 12/2/2025).                 History of Present Illness:    PITUITARY

## 2025-06-03 DIAGNOSIS — E03.8 CENTRAL HYPOTHYROIDISM: ICD-10-CM

## 2025-06-03 RX ORDER — LEVOTHYROXINE SODIUM 88 UG/1
88 TABLET ORAL
Qty: 90 TABLET | Refills: 3 | Status: SHIPPED | OUTPATIENT
Start: 2025-06-03

## (undated) DEVICE — KENDALL SCD EXPRESS SLEEVES, KNEE LENGTH, MEDIUM: Brand: KENDALL SCD

## (undated) DEVICE — NEEDLE HYPO 21GA L1.5IN INTRAMUSCULAR S STL LATCH BVL UP

## (undated) DEVICE — INTENDED FOR TISSUE SEPARATION, AND OTHER PROCEDURES THAT REQUIRE A SHARP SURGICAL BLADE TO PUNCTURE OR CUT.: Brand: BARD-PARKER ® STAINLESS STEEL BLADES

## (undated) DEVICE — SUTURE VCRL SZ 3-0 L27IN ABSRB UD L26MM SH 1/2 CIR J416H

## (undated) DEVICE — SOLUTION IV 1000ML 0.9% SOD CHL

## (undated) DEVICE — TRAY PREP DRY W/ PREM GLV 2 APPL 6 SPNG 2 UNDPD 1 OVERWRAP

## (undated) DEVICE — REM POLYHESIVE ADULT PATIENT RETURN ELECTRODE: Brand: VALLEYLAB

## (undated) DEVICE — DRAPE,TOP,102X53,STERILE: Brand: MEDLINE

## (undated) DEVICE — SUTURE VCRL SZ 2-0 L27IN ABSRB UD L26MM SH 1/2 CIR J417H

## (undated) DEVICE — SYR LR LCK 1ML GRAD NSAF 30ML --

## (undated) DEVICE — SHEET, DRAPE, SPLIT, STERILE: Brand: MEDLINE

## (undated) DEVICE — DRAIN WND PENRS RADPQ 0.25X12 --

## (undated) DEVICE — 3M™ TEGADERM™ TRANSPARENT FILM DRESSING FRAME STYLE, 1626W, 4 IN X 4-3/4 IN (10 CM X 12 CM), 50/CT 4CT/CASE: Brand: 3M™ TEGADERM™

## (undated) DEVICE — SLIM BODY SKIN STAPLER: Brand: APPOSE ULC

## (undated) DEVICE — SUT SLK 2-0SH 30IN BLK --

## (undated) DEVICE — SURGICAL PROCEDURE PACK BASIC ST FRANCIS

## (undated) DEVICE — SUTURE VCRL SZ 3-0 L18IN ABSRB UD W/O NDL POLYGLACTIN 910 J110T

## (undated) DEVICE — SUTURE PROL SZ 2-0 L36IN NONABSORBABLE BLU SH L26MM 1/2 CIR 8523H

## (undated) DEVICE — 3M™ IOBAN™ 2 ANTIMICROBIAL INCISE DRAPE 6650EZ: Brand: IOBAN™ 2

## (undated) DEVICE — AMD ANTIMICROBIAL NON-ADHERENT PAD,0.2% POLYHEXAMETHYLENE BIGUANIDE HCI (PHMB): Brand: TELFA

## (undated) DEVICE — BUTTON SWITCH PENCIL BLADE ELECTRODE, HOLSTER: Brand: EDGE